# Patient Record
Sex: MALE | Race: OTHER | HISPANIC OR LATINO | URBAN - METROPOLITAN AREA
[De-identification: names, ages, dates, MRNs, and addresses within clinical notes are randomized per-mention and may not be internally consistent; named-entity substitution may affect disease eponyms.]

---

## 2022-12-19 ENCOUNTER — HOSPITAL ENCOUNTER (OUTPATIENT)
Facility: HOSPITAL | Age: 27
Discharge: LEFT BEFORE DISCHARGE | End: 2022-12-19
Attending: EMERGENCY MEDICINE | Admitting: STUDENT IN AN ORGANIZED HEALTH CARE EDUCATION/TRAINING PROGRAM

## 2022-12-19 VITALS
OXYGEN SATURATION: 99 % | DIASTOLIC BLOOD PRESSURE: 72 MMHG | WEIGHT: 200 LBS | HEART RATE: 77 BPM | BODY MASS INDEX: 28.63 KG/M2 | RESPIRATION RATE: 18 BRPM | HEIGHT: 70 IN | SYSTOLIC BLOOD PRESSURE: 152 MMHG | TEMPERATURE: 99 F

## 2022-12-19 DIAGNOSIS — K35.80 ACUTE APPENDICITIS, UNSPECIFIED ACUTE APPENDICITIS TYPE: Primary | ICD-10-CM

## 2022-12-19 PROBLEM — K35.30 ACUTE APPENDICITIS WITH LOCALIZED PERITONITIS, WITHOUT PERFORATION, ABSCESS, OR GANGRENE: Status: ACTIVE | Noted: 2022-12-19

## 2022-12-19 LAB
ALBUMIN SERPL BCP-MCNC: 4.4 G/DL (ref 3.5–5.2)
ALP SERPL-CCNC: 70 U/L (ref 55–135)
ALT SERPL W/O P-5'-P-CCNC: 20 U/L (ref 10–44)
ANION GAP SERPL CALC-SCNC: 8 MMOL/L (ref 8–16)
AST SERPL-CCNC: 18 U/L (ref 10–40)
BASOPHILS # BLD AUTO: 0.04 K/UL (ref 0–0.2)
BASOPHILS NFR BLD: 0.3 % (ref 0–1.9)
BILIRUB SERPL-MCNC: 0.4 MG/DL (ref 0.1–1)
BUN SERPL-MCNC: 8 MG/DL (ref 6–20)
CALCIUM SERPL-MCNC: 9.2 MG/DL (ref 8.7–10.5)
CHLORIDE SERPL-SCNC: 102 MMOL/L (ref 95–110)
CO2 SERPL-SCNC: 23 MMOL/L (ref 23–29)
CREAT SERPL-MCNC: 0.7 MG/DL (ref 0.5–1.4)
DIFFERENTIAL METHOD: ABNORMAL
EOSINOPHIL # BLD AUTO: 0 K/UL (ref 0–0.5)
EOSINOPHIL NFR BLD: 0 % (ref 0–8)
ERYTHROCYTE [DISTWIDTH] IN BLOOD BY AUTOMATED COUNT: 12.4 % (ref 11.5–14.5)
EST. GFR  (NO RACE VARIABLE): >60 ML/MIN/1.73 M^2
GLUCOSE SERPL-MCNC: 120 MG/DL (ref 70–110)
HCT VFR BLD AUTO: 46.6 % (ref 40–54)
HCV AB SERPL QL IA: NORMAL
HGB BLD-MCNC: 15.6 G/DL (ref 14–18)
HIV 1+2 AB+HIV1 P24 AG SERPL QL IA: NORMAL
IMM GRANULOCYTES # BLD AUTO: 0.11 K/UL (ref 0–0.04)
IMM GRANULOCYTES NFR BLD AUTO: 0.7 % (ref 0–0.5)
LIPASE SERPL-CCNC: 9 U/L (ref 4–60)
LYMPHOCYTES # BLD AUTO: 1.1 K/UL (ref 1–4.8)
LYMPHOCYTES NFR BLD: 6.7 % (ref 18–48)
MCH RBC QN AUTO: 29.1 PG (ref 27–31)
MCHC RBC AUTO-ENTMCNC: 33.5 G/DL (ref 32–36)
MCV RBC AUTO: 87 FL (ref 82–98)
MONOCYTES # BLD AUTO: 0.5 K/UL (ref 0.3–1)
MONOCYTES NFR BLD: 2.8 % (ref 4–15)
NEUTROPHILS # BLD AUTO: 14.3 K/UL (ref 1.8–7.7)
NEUTROPHILS NFR BLD: 89.5 % (ref 38–73)
NRBC BLD-RTO: 0 /100 WBC
PLATELET # BLD AUTO: 272 K/UL (ref 150–450)
PMV BLD AUTO: 9.9 FL (ref 9.2–12.9)
POTASSIUM SERPL-SCNC: 4.2 MMOL/L (ref 3.5–5.1)
PROT SERPL-MCNC: 7.8 G/DL (ref 6–8.4)
RBC # BLD AUTO: 5.36 M/UL (ref 4.6–6.2)
SODIUM SERPL-SCNC: 133 MMOL/L (ref 136–145)
WBC # BLD AUTO: 15.93 K/UL (ref 3.9–12.7)

## 2022-12-19 PROCEDURE — G0378 HOSPITAL OBSERVATION PER HR: HCPCS

## 2022-12-19 PROCEDURE — 63600175 PHARM REV CODE 636 W HCPCS: Performed by: STUDENT IN AN ORGANIZED HEALTH CARE EDUCATION/TRAINING PROGRAM

## 2022-12-19 PROCEDURE — 25000003 PHARM REV CODE 250: Performed by: EMERGENCY MEDICINE

## 2022-12-19 PROCEDURE — 99285 PR EMERGENCY DEPT VISIT,LEVEL V: ICD-10-PCS | Mod: ,,, | Performed by: EMERGENCY MEDICINE

## 2022-12-19 PROCEDURE — 83690 ASSAY OF LIPASE: CPT | Performed by: EMERGENCY MEDICINE

## 2022-12-19 PROCEDURE — 96365 THER/PROPH/DIAG IV INF INIT: CPT

## 2022-12-19 PROCEDURE — S0030 INJECTION, METRONIDAZOLE: HCPCS | Performed by: EMERGENCY MEDICINE

## 2022-12-19 PROCEDURE — 99285 EMERGENCY DEPT VISIT HI MDM: CPT | Mod: ,,, | Performed by: EMERGENCY MEDICINE

## 2022-12-19 PROCEDURE — 96375 TX/PRO/DX INJ NEW DRUG ADDON: CPT

## 2022-12-19 PROCEDURE — 99285 EMERGENCY DEPT VISIT HI MDM: CPT | Mod: 25

## 2022-12-19 PROCEDURE — 85025 COMPLETE CBC W/AUTO DIFF WBC: CPT | Performed by: EMERGENCY MEDICINE

## 2022-12-19 PROCEDURE — 96372 THER/PROPH/DIAG INJ SC/IM: CPT | Mod: 59 | Performed by: EMERGENCY MEDICINE

## 2022-12-19 PROCEDURE — 80053 COMPREHEN METABOLIC PANEL: CPT | Performed by: EMERGENCY MEDICINE

## 2022-12-19 PROCEDURE — 87389 HIV-1 AG W/HIV-1&-2 AB AG IA: CPT | Performed by: PHYSICIAN ASSISTANT

## 2022-12-19 PROCEDURE — 96368 THER/DIAG CONCURRENT INF: CPT

## 2022-12-19 PROCEDURE — 86803 HEPATITIS C AB TEST: CPT | Performed by: PHYSICIAN ASSISTANT

## 2022-12-19 PROCEDURE — 63600175 PHARM REV CODE 636 W HCPCS: Performed by: EMERGENCY MEDICINE

## 2022-12-19 RX ORDER — ONDANSETRON 2 MG/ML
4 INJECTION INTRAMUSCULAR; INTRAVENOUS
Status: DISCONTINUED | OUTPATIENT
Start: 2022-12-19 | End: 2022-12-19

## 2022-12-19 RX ORDER — LIDOCAINE HYDROCHLORIDE 10 MG/ML
1 INJECTION, SOLUTION EPIDURAL; INFILTRATION; INTRACAUDAL; PERINEURAL ONCE AS NEEDED
Status: DISCONTINUED | OUTPATIENT
Start: 2022-12-19 | End: 2022-12-19 | Stop reason: HOSPADM

## 2022-12-19 RX ORDER — DICYCLOMINE HYDROCHLORIDE 10 MG/ML
20 INJECTION INTRAMUSCULAR
Status: COMPLETED | OUTPATIENT
Start: 2022-12-19 | End: 2022-12-19

## 2022-12-19 RX ORDER — METRONIDAZOLE 500 MG/100ML
500 INJECTION, SOLUTION INTRAVENOUS
Status: DISCONTINUED | OUTPATIENT
Start: 2022-12-19 | End: 2022-12-19 | Stop reason: HOSPADM

## 2022-12-19 RX ORDER — SODIUM CHLORIDE, SODIUM LACTATE, POTASSIUM CHLORIDE, CALCIUM CHLORIDE 600; 310; 30; 20 MG/100ML; MG/100ML; MG/100ML; MG/100ML
INJECTION, SOLUTION INTRAVENOUS CONTINUOUS
Status: DISCONTINUED | OUTPATIENT
Start: 2022-12-19 | End: 2022-12-19 | Stop reason: HOSPADM

## 2022-12-19 RX ORDER — ONDANSETRON 2 MG/ML
4 INJECTION INTRAMUSCULAR; INTRAVENOUS
Status: COMPLETED | OUTPATIENT
Start: 2022-12-19 | End: 2022-12-19

## 2022-12-19 RX ORDER — TALC
6 POWDER (GRAM) TOPICAL NIGHTLY
Status: DISCONTINUED | OUTPATIENT
Start: 2022-12-19 | End: 2022-12-19 | Stop reason: HOSPADM

## 2022-12-19 RX ORDER — METRONIDAZOLE 500 MG/100ML
500 INJECTION, SOLUTION INTRAVENOUS
Status: DISCONTINUED | OUTPATIENT
Start: 2022-12-20 | End: 2022-12-19 | Stop reason: HOSPADM

## 2022-12-19 RX ORDER — HYDROMORPHONE HYDROCHLORIDE 1 MG/ML
0.5 INJECTION, SOLUTION INTRAMUSCULAR; INTRAVENOUS; SUBCUTANEOUS
Status: COMPLETED | OUTPATIENT
Start: 2022-12-19 | End: 2022-12-19

## 2022-12-19 RX ORDER — SODIUM CHLORIDE 0.9 % (FLUSH) 0.9 %
10 SYRINGE (ML) INJECTION
Status: DISCONTINUED | OUTPATIENT
Start: 2022-12-19 | End: 2022-12-19 | Stop reason: HOSPADM

## 2022-12-19 RX ORDER — ONDANSETRON 2 MG/ML
4 INJECTION INTRAMUSCULAR; INTRAVENOUS EVERY 6 HOURS PRN
Status: DISCONTINUED | OUTPATIENT
Start: 2022-12-19 | End: 2022-12-19 | Stop reason: HOSPADM

## 2022-12-19 RX ORDER — CIPROFLOXACIN 2 MG/ML
400 INJECTION, SOLUTION INTRAVENOUS
Status: COMPLETED | OUTPATIENT
Start: 2022-12-19 | End: 2022-12-19

## 2022-12-19 RX ORDER — ACETAMINOPHEN 325 MG/1
650 TABLET ORAL EVERY 8 HOURS PRN
Status: DISCONTINUED | OUTPATIENT
Start: 2022-12-19 | End: 2022-12-19 | Stop reason: HOSPADM

## 2022-12-19 RX ORDER — CIPROFLOXACIN 2 MG/ML
400 INJECTION, SOLUTION INTRAVENOUS
Status: DISCONTINUED | OUTPATIENT
Start: 2022-12-20 | End: 2022-12-19 | Stop reason: HOSPADM

## 2022-12-19 RX ADMIN — HYDROMORPHONE HYDROCHLORIDE 0.5 MG: 1 INJECTION, SOLUTION INTRAMUSCULAR; INTRAVENOUS; SUBCUTANEOUS at 04:12

## 2022-12-19 RX ADMIN — DICYCLOMINE HYDROCHLORIDE 20 MG: 20 INJECTION INTRAMUSCULAR at 03:12

## 2022-12-19 RX ADMIN — CIPROFLOXACIN 400 MG: 2 INJECTION, SOLUTION INTRAVENOUS at 05:12

## 2022-12-19 RX ADMIN — SODIUM CHLORIDE, POTASSIUM CHLORIDE, SODIUM LACTATE AND CALCIUM CHLORIDE 1000 ML: 600; 310; 30; 20 INJECTION, SOLUTION INTRAVENOUS at 02:12

## 2022-12-19 RX ADMIN — METRONIDAZOLE 500 MG: 500 INJECTION, SOLUTION INTRAVENOUS at 05:12

## 2022-12-19 RX ADMIN — SODIUM CHLORIDE, POTASSIUM CHLORIDE, SODIUM LACTATE AND CALCIUM CHLORIDE: 600; 310; 30; 20 INJECTION, SOLUTION INTRAVENOUS at 05:12

## 2022-12-19 RX ADMIN — ONDANSETRON 4 MG: 2 INJECTION INTRAMUSCULAR; INTRAVENOUS at 03:12

## 2022-12-19 NOTE — Clinical Note
Diagnosis: Acute appendicitis, unspecified acute appendicitis type [[id number]]   Future Attending Provider: MAGNOLIA BRISENO [[id number]]   Admitting Provider:: MAGNOLIA BRISENO [[id number]]   Bed request comments: please do not put this human in EDOU

## 2022-12-19 NOTE — ED PROVIDER NOTES
Encounter Date: 12/19/2022    SCRIBE #1 NOTE: I, Meg Matthews, am scribing for, and in the presence of,  Toño Ambriz MD. I have scribed the entire note.     History     Chief Complaint   Patient presents with    Abdominal Pain     Pt c/o abdominal pain & vomiting     Time patient was seen by the provider: 2:13 PM      The patient is a 27 y.o. male with no significant past medical history who presents to the ED with a complaint of periumbilical abdominal pain onset this morning. Patient reports the onset of his abdominal pain was sudden and was asymptomatic prior to this episode. Described as a severe, constant pain that is scaled at a 10. Associated symptoms include abdominal distension, mild nausea, vomiting, and diarrhea. Denies fever, constipation, dysuria, and hematuria. There are no other exacerbating or alleviating factors at this time. Patient reports he has not had a similar episode in the past. Denies any pertinent or major PMHx. He also denies a social history of tobacco or illicit drug use. However, pt endorses EtOH use with his last drink approximately 15 days ago.     The history is provided by the patient and medical records. The history is limited by a language barrier. A  was used.   Review of patient's allergies indicates:   Allergen Reactions    Penicillins Anaphylaxis     Rash and throat closes     History reviewed. No pertinent past medical history.  History reviewed. No pertinent surgical history.  History reviewed. No pertinent family history.  Social History     Tobacco Use    Smoking status: Never    Smokeless tobacco: Never   Substance Use Topics    Alcohol use: Yes     Comment: occas, not daily    Drug use: Never     Review of Systems   Constitutional:  Negative for fever.   HENT:  Negative for sore throat.    Eyes:  Negative for visual disturbance.   Respiratory:  Negative for cough and shortness of breath.    Cardiovascular:  Negative for chest pain.    Gastrointestinal:  Positive for abdominal distention, abdominal pain, diarrhea, nausea and vomiting. Negative for blood in stool and constipation.   Genitourinary:  Negative for dysuria and hematuria.   Musculoskeletal:  Negative for neck pain.   Skin:  Negative for rash and wound.   Allergic/Immunologic: Negative for immunocompromised state.   Neurological:  Negative for syncope.   Psychiatric/Behavioral:  Negative for confusion.      Physical Exam     Initial Vitals [12/19/22 1237]   BP Pulse Resp Temp SpO2   133/75 69 16 97.3 °F (36.3 °C) 100 %      MAP       --         Physical Exam    Nursing note and vitals reviewed.  Constitutional: He appears well-developed and well-nourished. He is not diaphoretic. He appears distressed.   Distressed with pain.   HENT:   Head: Normocephalic and atraumatic.   Mouth/Throat: Mucous membranes are dry.   Eyes: EOM are normal. Pupils are equal, round, and reactive to light.   Neck: Neck supple.   Normal range of motion.  Cardiovascular:  Normal rate, regular rhythm, normal heart sounds and intact distal pulses.     Exam reveals no gallop and no friction rub.       No murmur heard.  Pulmonary/Chest: Breath sounds normal. No respiratory distress. He has no wheezes. He has no rhonchi. He has no rales.   Abdominal: Abdomen is soft. He exhibits no distension. There is abdominal tenderness in the periumbilical area. No hernia.   Periumbilical tenderness is noted with voluntary guarding. No masses or pulsations. Hypoactive bowel sounds auscultation. No epigastric tenderness.   No right CVA tenderness.  No left CVA tenderness. There is guarding. There is no rebound, no tenderness at McBurney's point and negative Perez's sign.   Genitourinary:    Genitourinary Comments: No CVA tenderness.     Musculoskeletal:         General: No tenderness or edema. Normal range of motion.      Cervical back: Normal range of motion and neck supple.     Neurological: He is alert and oriented to person,  place, and time. He has normal strength.   Skin: Skin is warm and dry. No rash noted.       ED Course   Procedures  Labs Reviewed   CBC W/ AUTO DIFFERENTIAL - Abnormal; Notable for the following components:       Result Value    WBC 15.93 (*)     Immature Granulocytes 0.7 (*)     Gran # (ANC) 14.3 (*)     Immature Grans (Abs) 0.11 (*)     Gran % 89.5 (*)     Lymph % 6.7 (*)     Mono % 2.8 (*)     All other components within normal limits   COMPREHENSIVE METABOLIC PANEL - Abnormal; Notable for the following components:    Sodium 133 (*)     Glucose 120 (*)     All other components within normal limits   HIV 1 / 2 ANTIBODY    Narrative:     Release to patient->Immediate   HEPATITIS C ANTIBODY    Narrative:     Release to patient->Immediate   LIPASE          Imaging Results               CT Abdomen Pelvis  Without Contrast (Final result)  Result time 12/19/22 16:34:33      Final result by Rosalio Mukherjee IV, MD (12/19/22 16:34:33)                   Impression:      Distended appendix with appendicoliths and mild surrounding inflammatory change suggestive of acute appendicitis.  No evidence of perforation or abscess.  Recommend correlation with history/exam.    Additional findings in the body of the report.    This report was flagged in Epic as abnormal.    Electronically signed by resident: Des Harrison  Date:    12/19/2022  Time:    16:01    Electronically signed by: Rosalio Mukherjee  Date:    12/19/2022  Time:    16:34               Narrative:    EXAMINATION:  CT ABDOMEN PELVIS WITHOUT CONTRAST    CLINICAL HISTORY:  Abdominal pain, acute, nonlocalized;    TECHNIQUE:  Low dose axial images, sagittal and coronal reformations were obtained from the lung bases to the pubic symphysis.  No intravenous or oral contrast was administered.  Coronal and sagittal reformats were created.    COMPARISON:  None    FINDINGS:  Heart is normal in size, no pericardial effusion.  Aorta tapers normally.    Lung bases are symmetrically  expanded.  No focal airspace opacities or abnormal pleural fluid.  Pulmonary micronodule along the right minor fissure measuring the order of 3 mm.  Punctate calcified granuloma at the right lung base.    Liver normal in size and attenuation, no focal lesions.  No calcified gallstones.  No biliary dilatation.  Spleen, pancreas and adrenal glands are unremarkable.  Kidneys are normal in size and contour.  No hydronephrosis.    Distal esophagus, stomach and duodenum are unremarkable.  No dilated loops of small or large bowel.  In the right lower quadrant there are 2 calcified appendicoliths within the proximal appendix measuring up to 1.0 cm.  There is distension of the appendix to 1.5 cm with wall thickening and hazy surrounding fat stranding suggestive of mild inflammatory change.  Small focus of air anti dependently within the appendix.    There appears to be nondistention and/or wall thickening of the adjacent ascending colon which is nearly abutting the appendix, likely a reactive process.  The remainder of the colon appears normal without evidence inflammatory change elsewhere.  No pneumoperitoneum.    Urinary bladder is distended without wall thickening.  No concerning pelvic lymphadenopathy.  No free pelvic fluid.    Subcutaneous soft tissue structures are unremarkable.  Bilateral L5 pars defects, likely chronic.  No lytic or destructive osseous lesion.                                       Medications   ciprofloxacin (CIPRO)400mg/200ml D5W IVPB 400 mg ( Intravenous Verify Only 12/19/22 1716)   metronidazole IVPB 500 mg (500 mg Intravenous New Bag 12/19/22 1717)   LIDOcaine (PF) 10 mg/ml (1%) injection 10 mg (has no administration in time range)   sodium chloride 0.9% flush 10 mL (has no administration in time range)   melatonin tablet 6 mg (has no administration in time range)   acetaminophen tablet 650 mg (has no administration in time range)   lactated ringers infusion (has no administration in time range)    ondansetron injection 4 mg (has no administration in time range)   ciprofloxacin (CIPRO)400mg/200ml D5W IVPB 400 mg (has no administration in time range)   metronidazole IVPB 500 mg (has no administration in time range)   lactated ringers bolus 1,000 mL (0 mLs Intravenous Stopped 12/19/22 1508)   ondansetron injection 4 mg (4 mg Intravenous Given 12/19/22 1507)   dicyclomine injection 20 mg (20 mg Intramuscular Given 12/19/22 1505)   HYDROmorphone injection 0.5 mg (0.5 mg Intravenous Given 12/19/22 1609)     Medical Decision Making:   History:   Old Medical Records: I decided to obtain old medical records.  Initial Assessment:   This is an emergent evaluation. Labs, CT, UA, IV fluids, IV Zofran, and IM bentyl have been ordered. I will reassess.  Clinical Tests:   Lab Tests: Ordered and Reviewed  Radiological Study: Ordered and Reviewed  ED Management:  16:01  The patient's pain persists despite IM bentyl. A small dose of dilaudid has been ordered.    16:26  The pt has a leukocytosis with a left shift. Chemistry and lipase are ok. CT scan is pending.    16:51  CT scan significant for acute appendicitis. I will consult General Surgery    16:53  General Surgery has not paged. A dose of IV ciprofloxacin has been ordered.    17:10   Via the , the patient states that he must leave against medical advice.  He states that he would like to speak to his family in person.  He would also like to let his employer know that he has appendicitis.  He is adamantly refusing admission or surgery at this time.  I explained to the patient that he runs the risks of appendix rupture, sepsis, bacteremia, worsening condition, and death.  He is alert and oriented x4.  He is able to make informed decisions for himself.  He has no signs of clinical intoxication.  He states that he will definitely return to the hospital tomorrow morning to be admitted for surgery.  He understands the risks and complications associated with  leaving against medical advice and will sign the AMA paperwork.  He has agreed to stay in the hospital until a dose of Cipro and Flagyl can be provided intravenously.  I have also instructed the patient to remain NPO.    17:17  The AMA form has been signed.  The patient is now receiving Flagyl and Cipro intravenously.  I am still attempting to get a hold of the general surgeon to let them know that the patient is leaving against medical advice.        Scribe Attestation:   Scribe #1: I performed the above scribed service and the documentation accurately describes the services I performed. I attest to the accuracy of the note.                   Clinical Impression:   Final diagnoses:  [K35.80] Acute appendicitis, unspecified acute appendicitis type (Primary)        ED Disposition Condition    AMA Fair                Toño Ambriz MD  12/19/22 8744

## 2022-12-20 ENCOUNTER — ANESTHESIA (OUTPATIENT)
Dept: SURGERY | Facility: HOSPITAL | Age: 27
End: 2022-12-20

## 2022-12-20 ENCOUNTER — ANESTHESIA EVENT (OUTPATIENT)
Dept: SURGERY | Facility: HOSPITAL | Age: 27
End: 2022-12-20

## 2022-12-20 ENCOUNTER — HOSPITAL ENCOUNTER (OUTPATIENT)
Facility: HOSPITAL | Age: 27
Discharge: HOME | End: 2022-12-21
Attending: EMERGENCY MEDICINE | Admitting: STUDENT IN AN ORGANIZED HEALTH CARE EDUCATION/TRAINING PROGRAM

## 2022-12-20 DIAGNOSIS — K35.30 ACUTE APPENDICITIS WITH LOCALIZED PERITONITIS, WITHOUT PERFORATION, ABSCESS, OR GANGRENE: Primary | ICD-10-CM

## 2022-12-20 LAB
ALBUMIN SERPL BCP-MCNC: 4.2 G/DL (ref 3.5–5.2)
ALP SERPL-CCNC: 67 U/L (ref 55–135)
ALT SERPL W/O P-5'-P-CCNC: 18 U/L (ref 10–44)
ANION GAP SERPL CALC-SCNC: 10 MMOL/L (ref 8–16)
AST SERPL-CCNC: 16 U/L (ref 10–40)
BASOPHILS # BLD AUTO: 0.03 K/UL (ref 0–0.2)
BASOPHILS NFR BLD: 0.1 % (ref 0–1.9)
BILIRUB SERPL-MCNC: 0.8 MG/DL (ref 0.1–1)
BILIRUB UR QL STRIP: NEGATIVE
BUN SERPL-MCNC: 7 MG/DL (ref 6–20)
CALCIUM SERPL-MCNC: 9.3 MG/DL (ref 8.7–10.5)
CHLORIDE SERPL-SCNC: 97 MMOL/L (ref 95–110)
CLARITY UR REFRACT.AUTO: CLEAR
CO2 SERPL-SCNC: 24 MMOL/L (ref 23–29)
COLOR UR AUTO: COLORLESS
CREAT SERPL-MCNC: 0.9 MG/DL (ref 0.5–1.4)
DIFFERENTIAL METHOD: ABNORMAL
EOSINOPHIL # BLD AUTO: 0 K/UL (ref 0–0.5)
EOSINOPHIL NFR BLD: 0.1 % (ref 0–8)
ERYTHROCYTE [DISTWIDTH] IN BLOOD BY AUTOMATED COUNT: 12.2 % (ref 11.5–14.5)
EST. GFR  (NO RACE VARIABLE): >60 ML/MIN/1.73 M^2
GLUCOSE SERPL-MCNC: 128 MG/DL (ref 70–110)
GLUCOSE UR QL STRIP: NEGATIVE
HCT VFR BLD AUTO: 47 % (ref 40–54)
HGB BLD-MCNC: 16.5 G/DL (ref 14–18)
HGB UR QL STRIP: NEGATIVE
IMM GRANULOCYTES # BLD AUTO: 0.08 K/UL (ref 0–0.04)
IMM GRANULOCYTES NFR BLD AUTO: 0.4 % (ref 0–0.5)
KETONES UR QL STRIP: NEGATIVE
LACTATE SERPL-SCNC: 2.1 MMOL/L (ref 0.5–2.2)
LEUKOCYTE ESTERASE UR QL STRIP: NEGATIVE
LYMPHOCYTES # BLD AUTO: 1.5 K/UL (ref 1–4.8)
LYMPHOCYTES NFR BLD: 7.6 % (ref 18–48)
MCH RBC QN AUTO: 29.6 PG (ref 27–31)
MCHC RBC AUTO-ENTMCNC: 35.1 G/DL (ref 32–36)
MCV RBC AUTO: 84 FL (ref 82–98)
MONOCYTES # BLD AUTO: 1.6 K/UL (ref 0.3–1)
MONOCYTES NFR BLD: 8.1 % (ref 4–15)
NEUTROPHILS # BLD AUTO: 16.9 K/UL (ref 1.8–7.7)
NEUTROPHILS NFR BLD: 83.7 % (ref 38–73)
NITRITE UR QL STRIP: NEGATIVE
NRBC BLD-RTO: 0 /100 WBC
PH UR STRIP: 7 [PH] (ref 5–8)
PLATELET # BLD AUTO: 261 K/UL (ref 150–450)
PMV BLD AUTO: 9.4 FL (ref 9.2–12.9)
POTASSIUM SERPL-SCNC: 3.8 MMOL/L (ref 3.5–5.1)
PROT SERPL-MCNC: 7.7 G/DL (ref 6–8.4)
PROT UR QL STRIP: NEGATIVE
RBC # BLD AUTO: 5.58 M/UL (ref 4.6–6.2)
SODIUM SERPL-SCNC: 131 MMOL/L (ref 136–145)
SP GR UR STRIP: 1.01 (ref 1–1.03)
URN SPEC COLLECT METH UR: ABNORMAL
WBC # BLD AUTO: 20.18 K/UL (ref 3.9–12.7)

## 2022-12-20 PROCEDURE — 36000709 HC OR TIME LEV III EA ADD 15 MIN: Performed by: STUDENT IN AN ORGANIZED HEALTH CARE EDUCATION/TRAINING PROGRAM

## 2022-12-20 PROCEDURE — 99285 EMERGENCY DEPT VISIT HI MDM: CPT | Mod: ,,, | Performed by: EMERGENCY MEDICINE

## 2022-12-20 PROCEDURE — 63600175 PHARM REV CODE 636 W HCPCS: Performed by: STUDENT IN AN ORGANIZED HEALTH CARE EDUCATION/TRAINING PROGRAM

## 2022-12-20 PROCEDURE — 88304 TISSUE EXAM BY PATHOLOGIST: CPT | Performed by: PATHOLOGY

## 2022-12-20 PROCEDURE — 25000003 PHARM REV CODE 250: Performed by: STUDENT IN AN ORGANIZED HEALTH CARE EDUCATION/TRAINING PROGRAM

## 2022-12-20 PROCEDURE — 80053 COMPREHEN METABOLIC PANEL: CPT | Performed by: EMERGENCY MEDICINE

## 2022-12-20 PROCEDURE — D9220A PRA ANESTHESIA: ICD-10-PCS | Mod: ,,, | Performed by: ANESTHESIOLOGY

## 2022-12-20 PROCEDURE — 44970 LAPAROSCOPY APPENDECTOMY: CPT | Mod: ,,, | Performed by: STUDENT IN AN ORGANIZED HEALTH CARE EDUCATION/TRAINING PROGRAM

## 2022-12-20 PROCEDURE — 27201423 OPTIME MED/SURG SUP & DEVICES STERILE SUPPLY: Performed by: STUDENT IN AN ORGANIZED HEALTH CARE EDUCATION/TRAINING PROGRAM

## 2022-12-20 PROCEDURE — 37000008 HC ANESTHESIA 1ST 15 MINUTES: Performed by: STUDENT IN AN ORGANIZED HEALTH CARE EDUCATION/TRAINING PROGRAM

## 2022-12-20 PROCEDURE — 25000003 PHARM REV CODE 250: Performed by: NURSE ANESTHETIST, CERTIFIED REGISTERED

## 2022-12-20 PROCEDURE — G0378 HOSPITAL OBSERVATION PER HR: HCPCS

## 2022-12-20 PROCEDURE — 44970 PR LAP,APPENDECTOMY: ICD-10-PCS | Mod: ,,, | Performed by: STUDENT IN AN ORGANIZED HEALTH CARE EDUCATION/TRAINING PROGRAM

## 2022-12-20 PROCEDURE — 96361 HYDRATE IV INFUSION ADD-ON: CPT

## 2022-12-20 PROCEDURE — 96365 THER/PROPH/DIAG IV INF INIT: CPT

## 2022-12-20 PROCEDURE — S0030 INJECTION, METRONIDAZOLE: HCPCS | Performed by: STUDENT IN AN ORGANIZED HEALTH CARE EDUCATION/TRAINING PROGRAM

## 2022-12-20 PROCEDURE — 85025 COMPLETE CBC W/AUTO DIFF WBC: CPT | Performed by: EMERGENCY MEDICINE

## 2022-12-20 PROCEDURE — 71000015 HC POSTOP RECOV 1ST HR: Performed by: STUDENT IN AN ORGANIZED HEALTH CARE EDUCATION/TRAINING PROGRAM

## 2022-12-20 PROCEDURE — 96367 TX/PROPH/DG ADDL SEQ IV INF: CPT

## 2022-12-20 PROCEDURE — 25000003 PHARM REV CODE 250: Performed by: EMERGENCY MEDICINE

## 2022-12-20 PROCEDURE — 63600175 PHARM REV CODE 636 W HCPCS: Performed by: NURSE ANESTHETIST, CERTIFIED REGISTERED

## 2022-12-20 PROCEDURE — 99285 EMERGENCY DEPT VISIT HI MDM: CPT | Mod: 25

## 2022-12-20 PROCEDURE — 71000033 HC RECOVERY, INTIAL HOUR: Performed by: STUDENT IN AN ORGANIZED HEALTH CARE EDUCATION/TRAINING PROGRAM

## 2022-12-20 PROCEDURE — 88304 PR  SURG PATH,LEVEL III: ICD-10-PCS | Mod: 26,,, | Performed by: PATHOLOGY

## 2022-12-20 PROCEDURE — 99285 PR EMERGENCY DEPT VISIT,LEVEL V: ICD-10-PCS | Mod: ,,, | Performed by: EMERGENCY MEDICINE

## 2022-12-20 PROCEDURE — D9220A PRA ANESTHESIA: Mod: ,,, | Performed by: ANESTHESIOLOGY

## 2022-12-20 PROCEDURE — 37000009 HC ANESTHESIA EA ADD 15 MINS: Performed by: STUDENT IN AN ORGANIZED HEALTH CARE EDUCATION/TRAINING PROGRAM

## 2022-12-20 PROCEDURE — 96372 THER/PROPH/DIAG INJ SC/IM: CPT | Performed by: STUDENT IN AN ORGANIZED HEALTH CARE EDUCATION/TRAINING PROGRAM

## 2022-12-20 PROCEDURE — 81003 URINALYSIS AUTO W/O SCOPE: CPT | Performed by: STUDENT IN AN ORGANIZED HEALTH CARE EDUCATION/TRAINING PROGRAM

## 2022-12-20 PROCEDURE — 96375 TX/PRO/DX INJ NEW DRUG ADDON: CPT

## 2022-12-20 PROCEDURE — 94761 N-INVAS EAR/PLS OXIMETRY MLT: CPT

## 2022-12-20 PROCEDURE — 96366 THER/PROPH/DIAG IV INF ADDON: CPT

## 2022-12-20 PROCEDURE — 88304 TISSUE EXAM BY PATHOLOGIST: CPT | Mod: 26,,, | Performed by: PATHOLOGY

## 2022-12-20 PROCEDURE — 36000708 HC OR TIME LEV III 1ST 15 MIN: Performed by: STUDENT IN AN ORGANIZED HEALTH CARE EDUCATION/TRAINING PROGRAM

## 2022-12-20 PROCEDURE — 83605 ASSAY OF LACTIC ACID: CPT | Performed by: EMERGENCY MEDICINE

## 2022-12-20 RX ORDER — OXYCODONE AND ACETAMINOPHEN 5; 325 MG/1; MG/1
1 TABLET ORAL
Status: DISCONTINUED | OUTPATIENT
Start: 2022-12-20 | End: 2022-12-20 | Stop reason: HOSPADM

## 2022-12-20 RX ORDER — MIDAZOLAM HYDROCHLORIDE 1 MG/ML
INJECTION, SOLUTION INTRAMUSCULAR; INTRAVENOUS
Status: DISCONTINUED | OUTPATIENT
Start: 2022-12-20 | End: 2022-12-20

## 2022-12-20 RX ORDER — ENOXAPARIN SODIUM 100 MG/ML
40 INJECTION SUBCUTANEOUS EVERY 24 HOURS
Status: DISCONTINUED | OUTPATIENT
Start: 2022-12-20 | End: 2022-12-21 | Stop reason: HOSPADM

## 2022-12-20 RX ORDER — SODIUM CHLORIDE 9 MG/ML
INJECTION, SOLUTION INTRAVENOUS CONTINUOUS
Status: DISCONTINUED | OUTPATIENT
Start: 2022-12-20 | End: 2022-12-20

## 2022-12-20 RX ORDER — ONDANSETRON 2 MG/ML
4 INJECTION INTRAMUSCULAR; INTRAVENOUS DAILY PRN
Status: DISCONTINUED | OUTPATIENT
Start: 2022-12-20 | End: 2022-12-20 | Stop reason: HOSPADM

## 2022-12-20 RX ORDER — KETOROLAC TROMETHAMINE 30 MG/ML
15 INJECTION, SOLUTION INTRAMUSCULAR; INTRAVENOUS EVERY 8 HOURS
Status: DISCONTINUED | OUTPATIENT
Start: 2022-12-20 | End: 2022-12-20

## 2022-12-20 RX ORDER — CIPROFLOXACIN 2 MG/ML
400 INJECTION, SOLUTION INTRAVENOUS
Status: DISCONTINUED | OUTPATIENT
Start: 2022-12-20 | End: 2022-12-21

## 2022-12-20 RX ORDER — ACETAMINOPHEN 500 MG
1000 TABLET ORAL EVERY 8 HOURS
Status: DISCONTINUED | OUTPATIENT
Start: 2022-12-20 | End: 2022-12-21 | Stop reason: HOSPADM

## 2022-12-20 RX ORDER — OXYCODONE HYDROCHLORIDE 10 MG/1
10 TABLET ORAL EVERY 6 HOURS PRN
Status: DISCONTINUED | OUTPATIENT
Start: 2022-12-20 | End: 2022-12-21 | Stop reason: HOSPADM

## 2022-12-20 RX ORDER — PROPOFOL 10 MG/ML
VIAL (ML) INTRAVENOUS
Status: DISCONTINUED | OUTPATIENT
Start: 2022-12-20 | End: 2022-12-20

## 2022-12-20 RX ORDER — IBUPROFEN 400 MG/1
400 TABLET ORAL EVERY 8 HOURS
Status: DISCONTINUED | OUTPATIENT
Start: 2022-12-20 | End: 2022-12-21 | Stop reason: HOSPADM

## 2022-12-20 RX ORDER — ROCURONIUM BROMIDE 10 MG/ML
INJECTION, SOLUTION INTRAVENOUS
Status: DISCONTINUED | OUTPATIENT
Start: 2022-12-20 | End: 2022-12-20

## 2022-12-20 RX ORDER — SODIUM CHLORIDE 0.9 % (FLUSH) 0.9 %
3 SYRINGE (ML) INJECTION
Status: DISCONTINUED | OUTPATIENT
Start: 2022-12-20 | End: 2022-12-20 | Stop reason: HOSPADM

## 2022-12-20 RX ORDER — BUPIVACAINE HYDROCHLORIDE 2.5 MG/ML
INJECTION, SOLUTION EPIDURAL; INFILTRATION; INTRACAUDAL
Status: DISCONTINUED | OUTPATIENT
Start: 2022-12-20 | End: 2022-12-20 | Stop reason: HOSPADM

## 2022-12-20 RX ORDER — METRONIDAZOLE 500 MG/100ML
500 INJECTION, SOLUTION INTRAVENOUS
Status: DISCONTINUED | OUTPATIENT
Start: 2022-12-20 | End: 2022-12-21

## 2022-12-20 RX ORDER — SODIUM CHLORIDE, SODIUM LACTATE, POTASSIUM CHLORIDE, CALCIUM CHLORIDE 600; 310; 30; 20 MG/100ML; MG/100ML; MG/100ML; MG/100ML
INJECTION, SOLUTION INTRAVENOUS CONTINUOUS
Status: DISCONTINUED | OUTPATIENT
Start: 2022-12-20 | End: 2022-12-21

## 2022-12-20 RX ORDER — HYDROMORPHONE HYDROCHLORIDE 1 MG/ML
0.2 INJECTION, SOLUTION INTRAMUSCULAR; INTRAVENOUS; SUBCUTANEOUS EVERY 5 MIN PRN
Status: DISCONTINUED | OUTPATIENT
Start: 2022-12-20 | End: 2022-12-20 | Stop reason: HOSPADM

## 2022-12-20 RX ORDER — LIDOCAINE HYDROCHLORIDE 20 MG/ML
INJECTION INTRAVENOUS
Status: DISCONTINUED | OUTPATIENT
Start: 2022-12-20 | End: 2022-12-20

## 2022-12-20 RX ORDER — HYDROMORPHONE HYDROCHLORIDE 1 MG/ML
0.2 INJECTION, SOLUTION INTRAMUSCULAR; INTRAVENOUS; SUBCUTANEOUS EVERY 6 HOURS PRN
Status: DISCONTINUED | OUTPATIENT
Start: 2022-12-20 | End: 2022-12-21 | Stop reason: HOSPADM

## 2022-12-20 RX ORDER — DEXAMETHASONE SODIUM PHOSPHATE 4 MG/ML
INJECTION, SOLUTION INTRA-ARTICULAR; INTRALESIONAL; INTRAMUSCULAR; INTRAVENOUS; SOFT TISSUE
Status: DISCONTINUED | OUTPATIENT
Start: 2022-12-20 | End: 2022-12-20

## 2022-12-20 RX ORDER — OXYCODONE HYDROCHLORIDE 5 MG/1
5 TABLET ORAL EVERY 6 HOURS PRN
Status: DISCONTINUED | OUTPATIENT
Start: 2022-12-20 | End: 2022-12-21 | Stop reason: HOSPADM

## 2022-12-20 RX ORDER — HALOPERIDOL 5 MG/ML
0.5 INJECTION INTRAMUSCULAR EVERY 10 MIN PRN
Status: DISCONTINUED | OUTPATIENT
Start: 2022-12-20 | End: 2022-12-20 | Stop reason: HOSPADM

## 2022-12-20 RX ORDER — ONDANSETRON 2 MG/ML
INJECTION INTRAMUSCULAR; INTRAVENOUS
Status: DISCONTINUED | OUTPATIENT
Start: 2022-12-20 | End: 2022-12-20

## 2022-12-20 RX ORDER — ACETAMINOPHEN 10 MG/ML
INJECTION, SOLUTION INTRAVENOUS
Status: DISCONTINUED | OUTPATIENT
Start: 2022-12-20 | End: 2022-12-20

## 2022-12-20 RX ORDER — FENTANYL CITRATE 50 UG/ML
INJECTION, SOLUTION INTRAMUSCULAR; INTRAVENOUS
Status: DISCONTINUED | OUTPATIENT
Start: 2022-12-20 | End: 2022-12-20

## 2022-12-20 RX ADMIN — KETOROLAC TROMETHAMINE 30 MG: 30 INJECTION, SOLUTION INTRAMUSCULAR at 03:12

## 2022-12-20 RX ADMIN — DEXAMETHASONE SODIUM PHOSPHATE 4 MG: 4 INJECTION, SOLUTION INTRAMUSCULAR; INTRAVENOUS at 02:12

## 2022-12-20 RX ADMIN — SODIUM CHLORIDE, POTASSIUM CHLORIDE, SODIUM LACTATE AND CALCIUM CHLORIDE: 600; 310; 30; 20 INJECTION, SOLUTION INTRAVENOUS at 04:12

## 2022-12-20 RX ADMIN — CIPROFLOXACIN 400 MG: 2 INJECTION, SOLUTION INTRAVENOUS at 10:12

## 2022-12-20 RX ADMIN — HYDROMORPHONE HYDROCHLORIDE 1 MG: 1 INJECTION, SOLUTION INTRAMUSCULAR; INTRAVENOUS; SUBCUTANEOUS at 02:12

## 2022-12-20 RX ADMIN — CIPROFLOXACIN 400 MG: 2 INJECTION, SOLUTION INTRAVENOUS at 08:12

## 2022-12-20 RX ADMIN — ENOXAPARIN SODIUM 40 MG: 40 INJECTION SUBCUTANEOUS at 07:12

## 2022-12-20 RX ADMIN — ACETAMINOPHEN 1000 MG: 10 INJECTION INTRAVENOUS at 03:12

## 2022-12-20 RX ADMIN — MIDAZOLAM HYDROCHLORIDE 2 MG: 1 INJECTION, SOLUTION INTRAMUSCULAR; INTRAVENOUS at 02:12

## 2022-12-20 RX ADMIN — SODIUM CHLORIDE: 0.9 INJECTION, SOLUTION INTRAVENOUS at 06:12

## 2022-12-20 RX ADMIN — ROCURONIUM BROMIDE 50 MG: 10 INJECTION INTRAVENOUS at 02:12

## 2022-12-20 RX ADMIN — ONDANSETRON 4 MG: 2 INJECTION INTRAMUSCULAR; INTRAVENOUS at 03:12

## 2022-12-20 RX ADMIN — METRONIDAZOLE 500 MG: 500 INJECTION, SOLUTION INTRAVENOUS at 06:12

## 2022-12-20 RX ADMIN — FENTANYL CITRATE 100 MCG: 50 INJECTION INTRAMUSCULAR; INTRAVENOUS at 02:12

## 2022-12-20 RX ADMIN — CIPROFLOXACIN 400 MG: 2 INJECTION, SOLUTION INTRAVENOUS at 02:12

## 2022-12-20 RX ADMIN — ACETAMINOPHEN 1000 MG: 500 TABLET ORAL at 09:12

## 2022-12-20 RX ADMIN — METRONIDAZOLE 500 MG: 500 INJECTION, SOLUTION INTRAVENOUS at 02:12

## 2022-12-20 RX ADMIN — LIDOCAINE HYDROCHLORIDE 50 MG: 20 INJECTION INTRAVENOUS at 02:12

## 2022-12-20 RX ADMIN — SODIUM CHLORIDE: 0.9 INJECTION, SOLUTION INTRAVENOUS at 02:12

## 2022-12-20 RX ADMIN — PROPOFOL 150 MG: 10 INJECTION, EMULSION INTRAVENOUS at 02:12

## 2022-12-20 RX ADMIN — IBUPROFEN 400 MG: 400 TABLET, FILM COATED ORAL at 09:12

## 2022-12-20 RX ADMIN — GLYCOPYRROLATE 0.1 MG: 0.2 INJECTION, SOLUTION INTRAMUSCULAR; INTRAVENOUS at 02:12

## 2022-12-20 RX ADMIN — HYDROMORPHONE HYDROCHLORIDE 0.2 MG: 1 INJECTION, SOLUTION INTRAMUSCULAR; INTRAVENOUS; SUBCUTANEOUS at 10:12

## 2022-12-20 RX ADMIN — METRONIDAZOLE 500 MG: 500 INJECTION, SOLUTION INTRAVENOUS at 11:12

## 2022-12-20 RX ADMIN — SUGAMMADEX 200 MG: 100 INJECTION, SOLUTION INTRAVENOUS at 03:12

## 2022-12-20 NOTE — PLAN OF CARE
Chart reviewed. Pre-op nursing care per orders. Assessment and history obtained with . Safe surgery checklist complete. Family not present at bedside, sent text to maurizio Early. Patient belongings placed in dosc locker. Waiting for surgery consent.

## 2022-12-20 NOTE — OP NOTE
[facility]  General Surgery  Operative Note    DATE OF PROCEDURE: 12/20/2022     PREOPERATIVE DIAGNOSIS: Acute appendicitis    POSTOPERATIVE DIAGNOSIS: Acute perforated appendicitis     PROCEDURE PERFORMED:  APPENDECTOMY, LAPAROSCOPIC  2.   REPAIR, HERNIA, UMBILICAL    ATTENDING SURGEON: Nhan Helms M.D.     HOUSESTAFF SURGEON: Gregorio Henao M.D. (PGY-3)     ANESTHESIA: General endotracheal and 0.25% bupivacaine.     ESTIMATED BLOOD LOSS: 25 mL.     FINDINGS: Acute perforated appendicitis and small fat-containing umbilical hernia     SPECIMEN: Appendix.     DRAINS: None.     COMPLICATIONS: None.     INDICATIONS: Linus Grant is a 27 y.o. man who presented to the Emergency Department with periumbilical abdominal pain, nausea, emesis and anorexia.  The history and exam were consistent with acute appendicitis, which was confirmed by laboratory studies and a CT scan. We recommended laparoscopic appendectomy and the patient initially left AMA because he needed to take care of work obligations. He re-presented to the ED with worsening symptoms and he was offered surgery.  The patient agreed to proceed. The patient signed informed consent and expressed understanding of the risks and benefits of surgery using a .     OPERATIVE PROCEDURE: The patient was identified in Preoperative Holding and brought back to the Operating Room. He was placed supine on the operating table and padded appropriately. Monitors were applied and there was smooth induction of general endotracheal anesthesia. A Bullock catheter was placed. The patient's abdomen was prepped and draped in the standard sterile surgical fashion. A time-out was performed and all team members present agreed this was the correct procedure on the correct patient. We also confirmed administration of appropriate preoperative antibiotics.    A 2-cm infraumbilical skin incision was made. Subcutaneous tissue was bluntly dissected.  We encountered a small umbilical hernia containing fat. We excised the sac and the abdomen was bluntly entered under direct vision.  A 12mm balloon trocar was placed and the abdomen was insufflated with carbon dioxide to a maximum pressure of 15 mmHg. A 10-mm laparoscope was placed and the abdomen was examined. There was no evidence of injury from the initial trocar placement. Two 5-mm trocars were placed under direct vision through separate stab incisions, one in the suprapubic area avoiding the dome of the bladder and one in the left lower quadrant avoiding the inferior epigastric artery. We directed our attention to the right lower quadrant. The appendix was identified adhered to the anterior abdominal and noted to have significant inflammatory change without evidence of perforation. However, when we grasped to elevate the appendix, the base of the appendix was perforated and there was spillage of some stool. This was aspirated. The appendix was elevated. A mesenteric defect was made at the base of the appendix using the [place] dissector. The appendix was divided at the base using the Endo-LEOBARDO stapler with a blue (45-3.5) load. The mesoappendix was then divided with one white (45-2.5) loads of the stapler. The appendix was placed into an Endocatch bag and removed from the Tahmina trocar without difficulty. We returned the laparoscope and Tahmina trocar to the umbilicus and reexamined the right lower quadrant. The staple line on the mesoappendix was examined and no bleeding was noted. The base of the appendix was examined and appeared viable and well-sealed. The right lower quadrant was irrigated moderately with saline until the returning effluent was clear. All ports were removed under direct vision and no bleeding from any port site was noted. The insufflation of the abdomen was evacuated and the laparoscope and the balloon trocar were removed. The fascial incision at the umbilical port site was closed with two  0 Vicryl stitches in order to also repair the hernia. All port sites were infiltrated with Marcaine and closed in a subcuticular fashion. Sterile dressings were applied. The patient's Bullock catheter was removed. The patient was extubated in the Operating Room and transported to the Recovery Room in stable condition. All sponge, instrument and needle counts were correct at the end of the case. I was present and scrubbed for the entire procedure.

## 2022-12-20 NOTE — ED NOTES
LOC: The patient is awake, alert, and oriented to self, place, time, and situation. Pt is calm and cooperative. Affect is appropriate.  Speech is appropriate and clear.     APPEARANCE: Patient resting comfortably in no acute distress.  Patient is clean and well groomed.    SKIN: The skin is warm and dry; color consistent with ethnicity.  Patient has normal skin turgor and moist mucus membranes.  Skin intact; no breakdown or bruising noted.     MUSCULOSKELETAL: Patient moving upper and lower extremities without difficulty; denies pain in the extremities or back.  Denies weakness.     RESPIRATORY: Airway is open and patent. Respirations spontaneous, even, easy, and non-labored.  Patient has a normal effort and rate.  No accessory muscle use noted. Denies cough.     CARDIAC:  Normal rate noted.  No peripheral edema noted. No complaints of chest pain.     ABDOMEN: Abdominal pan reported in RLQ, umbilical region, this area tender to palpation. No distention noted. Denies nausea, vomiting, diarrhea, or constipation.    NEUROLOGIC: Eyes open spontaneously.  Behavior appropriate to situation.  Follows commands; facial expression symmetrical.  Purposeful motor response noted; normal sensation in all extremities. Pt denies headache; denies lightheadedness or dizziness; denies visual disturbances; denies loss of balance; denies unilateral weakness.

## 2022-12-20 NOTE — H&P
Please see consult note dated 12/20/2022 for H&P.      Nhan Helms MD  General Surgery and Surgical Critical Care  [facility]

## 2022-12-20 NOTE — HPI
27M otherwise healthy with one day of periumbilical abdominal pain, nausea, non bilious emesis and anorexia.  AF  WBC is 16  CT shows appendicitis with fecaliths    No previous medical or surgical history    Allergic to PCN    Of note, prior to my arrival he was informed he had appendicitis and then requested to leave AMA and had already signed paperwork. Despite this, I took a complete history and examined him.

## 2022-12-20 NOTE — ANESTHESIA PROCEDURE NOTES
Intubation    Date/Time: 12/20/2022 2:36 PM  Performed by: Amanda Sosa CRNA  Authorized by: Adriano Seay Jr., MD     Intubation:     Induction:  Intravenous    Intubated:  Postinduction    Mask Ventilation:  Easy mask    Attempts:  1    Attempted By:  CRNA    Method of Intubation:  Direct    Laryngeal View Grade: Grade I - full view of cords      Difficult Airway Encountered?: No      Complications:  None    Airway Device:  Oral endotracheal tube    Airway Device Size:  7.0    Style/Cuff Inflation:  Cuffed (inflated to minimal occlusive pressure)    Inflation Amount (mL):  5    Placement Verified By:  Capnometry    Complicating Factors:  None    Findings Post-Intubation:  BS equal bilateral

## 2022-12-20 NOTE — ASSESSMENT & PLAN NOTE
Linus Grant is a 27 y.o. gentleman with appendicitis    - admit to general surgery  - mIVF  - multimodal pain control  - allergy to penicillins, will place on cipro/flagyl  - To OR today for lap appendectomy  - case request placed, will obtain consent later

## 2022-12-20 NOTE — ED PROVIDER NOTES
Encounter Date: 12/20/2022       History     Chief Complaint   Patient presents with    Abdominal Pain     Pt c/o R sided abdominal pain starting yesterday. -N/V/D. -fevers.      HPI    Linus Grant is a 27-year-old male with no significant past medical history presenting with periumbilical abdominal pain now radiating to the right lower quadrant.  His pain initially began yesterday and he was seen in the emergency department yesterday and diagnosed with acute appendicitis with appendicoliths.  He would a leukocytosis of 15,000 yesterday, with 10/10 pain that was severe, constant.  Associated symptoms included abdominal distention, nausea, vomiting and diarrhea.  Denies any symptoms in the past.  Denies any pertinent or major prior medical history.  No previous history of surgeries.  He denies any testicular pain, radiation testicles, or groin pain.  Onset was sudden, associated with right lower quadrant pain that is worsened.  Of note, he had to leave AMA yesterday to take care of work obligations.    Review of patient's allergies indicates:   Allergen Reactions    Penicillins Anaphylaxis     Rash and throat closes     History reviewed. No pertinent past medical history.  History reviewed. No pertinent surgical history.  History reviewed. No pertinent family history.  Social History     Tobacco Use    Smoking status: Never    Smokeless tobacco: Never   Substance Use Topics    Alcohol use: Yes     Comment: occas, not daily    Drug use: Never     Review of Systems   Constitutional:  Negative for chills, fatigue and fever.   HENT:  Negative for congestion and sore throat.    Eyes:  Negative for photophobia and visual disturbance.   Respiratory:  Negative for chest tightness and shortness of breath.    Cardiovascular:  Negative for chest pain and palpitations.   Gastrointestinal:  Positive for abdominal distention, abdominal pain, diarrhea, nausea and vomiting. Negative for blood in stool.   Genitourinary:  Negative for  dysuria, flank pain and genital sores.   Musculoskeletal:  Negative for myalgias and neck pain.   Skin:  Negative for color change and wound.   Neurological:  Negative for weakness and light-headedness.   Psychiatric/Behavioral:  Negative for confusion.      Physical Exam     Initial Vitals [12/20/22 0604]   BP Pulse Resp Temp SpO2   133/73 105 16 98.1 °F (36.7 °C) 100 %      MAP       --         Physical Exam    Nursing note and vitals reviewed.    Constitutional: Well-developed. Well-nourished. Minimal emotional distress.  HENT: OP clear and moist.  NECK: Supple. No cervical LAD.  CARDIAC: RRR. Normal S1/ S2. No murmurs, gallops or rubs. 2+ distal pulses.  PULM: Normal effort. Breath sounds clear - no wheezes, rales, rhonchi.  ABD: Soft, right lower quadrant tenderness, non-distended, normal BS. Negative Perez sign, positive McBurney's point tenderness.  There is guarding guarding, no rebound.  : No CVA tenderness.  Normal genitalia, no inguinal hernia, no testicular pain.  RECTAL: deferred  MS: Full ROM. No edema.   NEURO: Alert and oriented x3., no sensory or motor deficits.  Moving all extremities purposefully.  SKIN: Warm and dry. No rash or lesions.  No cyanosis or jaundice.  PSYCH: Normal judgment. Normal affect.      ED Course   Procedures  Labs Reviewed   CBC W/ AUTO DIFFERENTIAL - Abnormal; Notable for the following components:       Result Value    WBC 20.18 (*)     Gran # (ANC) 16.9 (*)     Immature Grans (Abs) 0.08 (*)     Mono # 1.6 (*)     Gran % 83.7 (*)     Lymph % 7.6 (*)     All other components within normal limits   COMPREHENSIVE METABOLIC PANEL - Abnormal; Notable for the following components:    Sodium 131 (*)     Glucose 128 (*)     All other components within normal limits   URINALYSIS, REFLEX TO URINE CULTURE - Abnormal; Notable for the following components:    Color, UA Colorless (*)     All other components within normal limits    Narrative:     Specimen Source->Urine   LACTIC ACID,  PLASMA          Imaging Results    None          Medications   metronidazole IVPB 500 mg (500 mg Intravenous Given 12/20/22 1440)   ciprofloxacin (CIPRO)400mg/200ml D5W IVPB 400 mg (400 mg Intravenous New Bag 12/20/22 2213)   oxyCODONE immediate release tablet Tab 10 mg (has no administration in time range)   oxyCODONE immediate release tablet 5 mg (has no administration in time range)   acetaminophen tablet 1,000 mg (1,000 mg Oral Given 12/20/22 2157)   HYDROmorphone injection 0.2 mg (1 mg Intravenous Given 12/20/22 1454)   lactated ringers infusion ( Intravenous New Bag 12/20/22 1622)   ibuprofen tablet 400 mg (400 mg Oral Given 12/20/22 2157)   enoxaparin injection 40 mg (40 mg Subcutaneous Given 12/20/22 1950)     Medical Decision Making:   History:   Old Medical Records: I decided to obtain old medical records.  Old Records Summarized: other records.       <> Summary of Records: Records from ED visit yesterday with CT scan evaluated and reviewed shows concern for acute appendicitis with appendicolith.  He would a leukocytosis of 15,000 yesterday, tachycardic, and recommended for admission for appendicitis.  Initial Assessment:   Linus Grant is a 27-year-old male with no significant past medical history presenting with periumbilical abdominal pain now radiating to the right lower quadrant.   Differential Diagnosis:   Appendicitis, perforation, intra-abdominal abscess, sepsis, bacteremia  Independently Interpreted Test(s):   I have ordered and independently interpreted X-rays - see prior notes.  Clinical Tests:   Lab Tests: Ordered and Reviewed  Radiological Study: Reviewed  Other:   I have discussed this case with another health care provider.       <> Summary of the Discussion: General surgery at 6:15 a.m., did discuss admission for acute appendicitis - patient received Cipro and Flagyl yesterday for antibiotics, deferred antibiotics to admitting team.                 Currently afebrile, with tachycardia  without hypotension.  Physical exam findings remarkable for right lower quadrant tenderness, positive McBurney point tenderness with localized peritonitis.  I reviewed his CT imaging from yesterday which on my interpretation shows acute appendicitis with appendicolith.  There is no abscess or perforation.  Full set of labs reobtained including CBC, CMP and lactic acid.  UA without evidence of UTI.  CBC shows leukocytosis of 20,000 with a left shift.  He also has minimal acidosis and a lactate of 2.1.  His symptoms are severe and acute and likely consistent with acute appendicitis.  Discussed case with General surgery, they will admit for likely operative intervention given acute localized peritonitis secondary to acute appendicitis.  Patient agreeable to admission plan.    Complexity: High - level 5           Clinical Impression:   Final diagnoses:  [K35.30] Acute appendicitis with localized peritonitis, without perforation, abscess, or gangrene (Primary)        ED Disposition Condition    Observation                Chip Ryan DO, FAAEM  Emergency Staff Physician   Dept of Emergency Medicine   [facility]  Spectralink: [id number]        Disclaimer: This note has been generated using voice-recognition software. There may be typographical errors that have been missed during proof-reading.       Chip Ryan DO  12/20/22 2156

## 2022-12-20 NOTE — TRANSFER OF CARE
"Anesthesia Transfer of Care Note    Patient: Linus Grant    Procedure(s) Performed: Procedure(s) (LRB):  APPENDECTOMY, LAPAROSCOPIC (N/A)  REPAIR, HERNIA, UMBILICAL    Patient location: PACU    Anesthesia Type: general    Transport from OR: Transported from OR on 6-10 L/min O2 by face mask with adequate spontaneous ventilation    Post pain: adequate analgesia    Post assessment: no apparent anesthetic complications and tolerated procedure well    Post vital signs: stable    Level of consciousness: awake    Nausea/Vomiting: no nausea/vomiting    Complications: none    Transfer of care protocol was followed      Last vitals:   Visit Vitals  /84 (BP Location: Right arm, Patient Position: Lying)   Pulse 94   Temp 37.6 °C (99.6 °F) (Oral)   Resp 20   Ht 5' 10" (1.778 m)   Wt 90.7 kg (200 lb)   SpO2 97%   BMI 28.70 kg/m²     "

## 2022-12-20 NOTE — Clinical Note
Diagnosis: Acute appendicitis with localized peritonitis, without perforation, abscess, or gangrene [[id number]]   Future Attending Provider: MAGNOLIA BRISENO [[id number]]   Admitting Provider:: MAGNOLIA BRISENO [[id number]]

## 2022-12-20 NOTE — HPI
Linus Grant is a 27 y.o. gentleman with no PMH who presents with abdominal pain since yesterday morning. He reports associated nausea and nonbilious emesis. Denies fevers, chills. He presented to the ED yesterday and was found to have appendicitis with fecaliths, but had prior engagements that he had to attend to and left AMA. He returns this morning with worsening abdominal pain and his pain has moved from his umbilicus to his RLQ. His WBC has gone from 16 to 20. He remains nontoxic appearing. Tender in his RLQ, positive rebound.

## 2022-12-20 NOTE — ANESTHESIA PREPROCEDURE EVALUATION
[facility]  Anesthesia Pre-Operative Evaluation         Patient Name: Linus Grant  YOB: 1995  MRN: [id number]    SUBJECTIVE:     Pre-operative evaluation for Procedure(s) (LRB):  APPENDECTOMY, LAPAROSCOPIC (N/A)     12/20/2022    Linus Grant is a 27 y.o. male w/ no significant PMHx presents with appendicitis.     Patient now presents for the above procedure(s).    Anaphylaxis to penicillin    NPO since yesterday    LDA:        Peripheral IV - Single Lumen 12/19/22 1450 20 G Right Antecubital (Active)   Site Assessment Clean;Dry;Intact 12/19/22 1450   Dressing Status Clean;Dry;Intact 12/19/22 1450   Number of days: 0            Peripheral IV - Single Lumen 12/20/22 0630 20 G Left Antecubital (Active)   Site Assessment Clean;Dry;Intact 12/20/22 0630   Line Status Blood return noted;Saline locked;Flushed 12/20/22 0630   Dressing Status Clean;Dry;Intact 12/20/22 0630   Dressing Intervention First dressing 12/20/22 0630   Number of days: 0       Prev airway: None documented.    Drips:    lactated ringers         Patient Active Problem List   Diagnosis    Acute appendicitis with localized peritonitis, without perforation, abscess, or gangrene       Review of patient's allergies indicates:   Allergen Reactions    Penicillins Anaphylaxis     Rash and throat closes       Current Inpatient Medications:   acetaminophen  1,000 mg Oral Q8H    ciprofloxacin  400 mg Intravenous Q8H    ketorolac  15 mg Intravenous Q8H    metronidazole  500 mg Intravenous Q8H       No current facility-administered medications on file prior to encounter.     No current outpatient medications on file prior to encounter.       History reviewed. No pertinent surgical history.    Social History     Socioeconomic History    Marital status: Single   Tobacco Use    Smoking status: Never    Smokeless tobacco: Never   Substance and Sexual Activity    Alcohol use: Yes     Comment: occas, not daily     Drug use: Never    Sexual activity: Not Currently       OBJECTIVE:     Vital Signs Range (Last 24H):  Temp:  [36.3 °C (97.3 °F)-36.9 °C (98.5 °F)]   Pulse:  []   Resp:  [16-20]   BP: (133-152)/(70-80)   SpO2:  [97 %-100 %]       Significant Labs:  Lab Results   Component Value Date    WBC 20.18 (H) 12/20/2022    HGB 16.5 12/20/2022    HCT 47.0 12/20/2022     12/20/2022    ALT 18 12/20/2022    AST 16 12/20/2022     (L) 12/20/2022    K 3.8 12/20/2022    CL 97 12/20/2022    CREATININE 0.9 12/20/2022    BUN 7 12/20/2022    CO2 24 12/20/2022       Diagnostic Studies: No relevant studies.    EKG:   No results found for this or any previous visit.    2D ECHO:  TTE:  No results found for this or any previous visit.    JUAN LUIS:  No results found for this or any previous visit.    ASSESSMENT/PLAN:         Pre-op Assessment    I have reviewed the Patient Summary Reports.     I have reviewed the Nursing Notes. I have reviewed the NPO Status.   I have reviewed the Medications.     Review of Systems  Anesthesia Hx:  No problems with previous Anesthesia  History of prior surgery of interest to airway management or planning: Denies Family Hx of Anesthesia complications.   Denies Personal Hx of Anesthesia complications.   Social:  Non-Smoker, No Alcohol Use    Hematology/Oncology:  Hematology Normal   Oncology Normal     EENT/Dental:EENT/Dental Normal   Cardiovascular:   Exercise tolerance: good Denies CAD.    Denies Dysrhythmias.     Pulmonary:   Denies COPD.  Denies Sleep Apnea.    Hepatic/GI:   Denies GERD.    Neurological:   Denies Neuromuscular Disease.    Endocrine:   Denies Diabetes.    Psych:   Denies Psychiatric History.          Physical Exam  General: Well nourished, Cooperative, Alert and Oriented    Airway:  Mallampati: II   Mouth Opening: Normal  TM Distance: Normal  Tongue: Normal  Neck ROM: Normal ROM    Dental:  Intact    Chest/Lungs:  Clear to auscultation, Normal Respiratory  Rate    Heart:  Rate: Normal  Rhythm: Regular Rhythm  Sounds: Normal    Abdomen:  Nontender, Soft, Normal        Anesthesia Plan  Type of Anesthesia, risks & benefits discussed:    Anesthesia Type: Gen ETT  Intra-op Monitoring Plan: Standard ASA Monitors  Post Op Pain Control Plan: multimodal analgesia  Induction:  IV  Airway Plan: Direct, Post-Induction  Informed Consent: Informed consent signed with the Patient and all parties understand the risks and agree with anesthesia plan.  All questions answered.   ASA Score: 2 Emergent  Day of Surgery Review of History & Physical: H&P Update referred to the surgeon/provider.    Ready For Surgery From Anesthesia Perspective.     .

## 2022-12-20 NOTE — BRIEF OP NOTE
[facility] - Surgery ([facility])  Brief Operative Note    SUMMARY     Surgery Date: 12/20/2022     Surgeon(s) and Role:     * Nhan Helms MD - Primary     * Gregorio Henao MD - Resident - Assisting    Pre-op Diagnosis:  Acute appendicitis with localized peritonitis, without perforation, abscess, or gangrene [K35.30]    Post-op Diagnosis:  Post-Op Diagnosis Codes:     * Acute appendicitis with localized peritonitis, without perforation, abscess, or gangrene [K35.30]    Procedure(s) (LRB):  APPENDECTOMY, LAPAROSCOPIC (N/A)  REPAIR, HERNIA, UMBILICAL    Anesthesia: Choice    Operative Findings: Laparoscopic appendectomy performed and pt found to have perforated appendicitis. Small, fat-containing umbilical hernia repaired primarily.    Estimated Blood Loss: 25 mL    Estimated Blood Loss has been documented.         Specimens:   Specimen (24h ago, onward)      None            [id number]

## 2022-12-20 NOTE — ED NOTES
Assumed care of patient. Patient on automatic blood pressure cuff and continuous pulse oximeter. VSS. AAOx3. Pt resting comfortably in stretcher. Respirations even and unlabored. Side rails up and bed in lowest position. Call light in reach. Pt in hospital gown and all belongings in personal belongings bag.

## 2022-12-20 NOTE — SUBJECTIVE & OBJECTIVE
No current facility-administered medications on file prior to encounter.     No current outpatient medications on file prior to encounter.       Review of patient's allergies indicates:   Allergen Reactions    Penicillins Anaphylaxis     Rash and throat closes       History reviewed. No pertinent past medical history.  History reviewed. No pertinent surgical history.  Family History    None       Tobacco Use    Smoking status: Never    Smokeless tobacco: Never   Substance and Sexual Activity    Alcohol use: Yes     Comment: occas, not daily    Drug use: Never    Sexual activity: Not on file     Review of Systems   Constitutional:  Positive for activity change and appetite change. Negative for chills and fever.   HENT: Negative.     Eyes: Negative.    Respiratory: Negative.  Negative for cough and shortness of breath.    Cardiovascular: Negative.  Negative for chest pain and palpitations.   Gastrointestinal:  Positive for abdominal pain, nausea and vomiting. Negative for abdominal distention, constipation and diarrhea.   Endocrine: Negative.    Genitourinary: Negative.  Negative for dysuria.   Musculoskeletal: Negative.    Skin: Negative.    Allergic/Immunologic: Negative.    Neurological: Negative.    Hematological: Negative.    Psychiatric/Behavioral: Negative.     Objective:     Vital Signs (Most Recent):  Temp: 98.5 °F (36.9 °C) (12/19/22 1752)  Pulse: 77 (12/19/22 1752)  Resp: 18 (12/19/22 1752)  BP: (!) 152/72 (12/19/22 1752)  SpO2: 99 % (12/19/22 1752)   Vital Signs (24h Range):  Temp:  [97.3 °F (36.3 °C)-98.5 °F (36.9 °C)] 98.5 °F (36.9 °C)  Pulse:  [57-77] 77  Resp:  [16-18] 18  SpO2:  [99 %-100 %] 99 %  BP: (133-152)/(72-75) 152/72     Weight: 90.7 kg (200 lb)  Body mass index is 28.7 kg/m².    Physical Exam  Vitals reviewed.   Constitutional:       Appearance: Normal appearance.   HENT:      Head: Normocephalic.   Cardiovascular:      Rate and Rhythm: Normal rate.   Pulmonary:      Effort: Pulmonary effort  is normal. No respiratory distress.   Abdominal:      General: Abdomen is flat. There is no distension.      Tenderness: There is no abdominal tenderness.      Hernia: No hernia is present.      Comments: Non tender  He does not have RLQ pain yet   Musculoskeletal:      Cervical back: No rigidity.   Skin:     General: Skin is warm and dry.      Capillary Refill: Capillary refill takes less than 2 seconds.   Neurological:      General: No focal deficit present.      Mental Status: He is alert and oriented to person, place, and time.   Psychiatric:         Mood and Affect: Mood normal.       Significant Labs:  I have reviewed all pertinent lab results within the past 24 hours.  CBC:   Recent Labs   Lab 12/19/22  1450   WBC 15.93*   RBC 5.36   HGB 15.6   HCT 46.6      MCV 87   MCH 29.1   MCHC 33.5     CMP:   Recent Labs   Lab 12/19/22  1450   *   CALCIUM 9.2   ALBUMIN 4.4   PROT 7.8   *   K 4.2   CO2 23      BUN 8   CREATININE 0.7   ALKPHOS 70   ALT 20   AST 18   BILITOT 0.4       Significant Diagnostics:  I have reviewed all pertinent imaging results/findings within the past 24 hours.    CT shows dilated appendix, surrounding inflammation with fecaliths in the tip

## 2022-12-20 NOTE — ASSESSMENT & PLAN NOTE
27M otherwise healthy with early appendicitis with fecaliths confirmed on CT with associated leukocytosis of 16k.  Had already signed out AMA just prior to my arrival at bedside  I explained to him the considerable risks he was taking by leaving without surgery  He understands that he risks a prolonged course in the hospital if the appendix were to rupture, and could even die if he does not have surgery  He insists on going home to talk to his boss about the operation face to face; a phone call will apparently not suffice  I advised him that this is an urgent operation and that he should prioritize his health over his work at this time  He is intransigent  I felt like I did all I could to convince him to stay and that he is in sound mind, competent to make decisions for himself  He does say that he will come back tomorrow to get the surgery  Will see  We will be here

## 2022-12-20 NOTE — CONSULTS
[facility] Dept  General Surgery  Consult Note    Patient Name: Linus Grant  MRN: [id number]  Code Status: Full Code  Admission Date: 12/19/2022  Hospital Length of Stay: 0 days  Attending Physician: Nhan Helms MD  Primary Care Provider: No primary care provider on file.    Patient information was obtained from patient and ER records.     Inpatient consult to General surgery  Consult performed by: STEVE Berg MD  Consult ordered by: Toño Ambriz MD  Reason for consult: appendicitis        Subjective:     Principal Problem: acute appendicitis    History of Present Illness: 27M otherwise healthy with one day of periumbilical abdominal pain, nausea, non bilious emesis and anorexia.  AF  WBC is 16  CT shows appendicitis with fecaliths    No previous medical or surgical history    Allergic to PCN    Of note, prior to my arrival he was informed he had appendicitis and then requested to leave AMA and had already signed paperwork. Despite this, I took a complete history and examined him.      No current facility-administered medications on file prior to encounter.     No current outpatient medications on file prior to encounter.       Review of patient's allergies indicates:   Allergen Reactions    Penicillins Anaphylaxis     Rash and throat closes       History reviewed. No pertinent past medical history.  History reviewed. No pertinent surgical history.  Family History    None       Tobacco Use    Smoking status: Never    Smokeless tobacco: Never   Substance and Sexual Activity    Alcohol use: Yes     Comment: occas, not daily    Drug use: Never    Sexual activity: Not on file     Review of Systems   Constitutional:  Positive for activity change and appetite change. Negative for chills and fever.   HENT: Negative.     Eyes: Negative.    Respiratory: Negative.  Negative for cough and shortness of breath.    Cardiovascular: Negative.  Negative for chest pain and palpitations.    Gastrointestinal:  Positive for abdominal pain, nausea and vomiting. Negative for abdominal distention, constipation and diarrhea.   Endocrine: Negative.    Genitourinary: Negative.  Negative for dysuria.   Musculoskeletal: Negative.    Skin: Negative.    Allergic/Immunologic: Negative.    Neurological: Negative.    Hematological: Negative.    Psychiatric/Behavioral: Negative.     Objective:     Vital Signs (Most Recent):  Temp: 98.5 °F (36.9 °C) (12/19/22 1752)  Pulse: 77 (12/19/22 1752)  Resp: 18 (12/19/22 1752)  BP: (!) 152/72 (12/19/22 1752)  SpO2: 99 % (12/19/22 1752)   Vital Signs (24h Range):  Temp:  [97.3 °F (36.3 °C)-98.5 °F (36.9 °C)] 98.5 °F (36.9 °C)  Pulse:  [57-77] 77  Resp:  [16-18] 18  SpO2:  [99 %-100 %] 99 %  BP: (133-152)/(72-75) 152/72     Weight: 90.7 kg (200 lb)  Body mass index is 28.7 kg/m².    Physical Exam  Vitals reviewed.   Constitutional:       Appearance: Normal appearance.   HENT:      Head: Normocephalic.   Cardiovascular:      Rate and Rhythm: Normal rate.   Pulmonary:      Effort: Pulmonary effort is normal. No respiratory distress.   Abdominal:      General: Abdomen is flat. There is no distension.      Tenderness: There is no abdominal tenderness.      Hernia: No hernia is present.      Comments: Non tender  He does not have RLQ pain yet   Musculoskeletal:      Cervical back: No rigidity.   Skin:     General: Skin is warm and dry.      Capillary Refill: Capillary refill takes less than 2 seconds.   Neurological:      General: No focal deficit present.      Mental Status: He is alert and oriented to person, place, and time.   Psychiatric:         Mood and Affect: Mood normal.       Significant Labs:  I have reviewed all pertinent lab results within the past 24 hours.  CBC:   Recent Labs   Lab 12/19/22  1450   WBC 15.93*   RBC 5.36   HGB 15.6   HCT 46.6      MCV 87   MCH 29.1   MCHC 33.5     CMP:   Recent Labs   Lab 12/19/22  1450   *   CALCIUM 9.2   ALBUMIN 4.4    PROT 7.8   *   K 4.2   CO2 23      BUN 8   CREATININE 0.7   ALKPHOS 70   ALT 20   AST 18   BILITOT 0.4       Significant Diagnostics:  I have reviewed all pertinent imaging results/findings within the past 24 hours.    CT shows dilated appendix, surrounding inflammation with fecaliths in the tip      Assessment/Plan:     Acute appendicitis with localized peritonitis, without perforation, abscess, or gangrene  27M otherwise healthy with early appendicitis with fecaliths confirmed on CT with associated leukocytosis of 16k.  Had already signed out AMA just prior to my arrival at bedside  I explained to him the considerable risks he was taking by leaving without surgery  He understands that he risks a prolonged course in the hospital if the appendix were to rupture, and could even die if he does not have surgery  He insists on going home to talk to his boss about the operation face to face; a phone call will apparently not suffice  I advised him that this is an urgent operation and that he should prioritize his health over his work at this time. I told him that I felt he was making a grave error.  He is intransigent  I felt like I did all I could to convince him to stay and that he is in sound mind, competent to make decisions for himself  He does say that he will come back tomorrow to get the surgery   Will see  We will be here    This conversation was had via video  with Kalpesh #[id number].          VTE Risk Mitigation (From admission, onward)         Ordered     Place sequential compression device  Until discontinued         12/19/22 1711     IP VTE LOW RISK PATIENT  Once         12/19/22 1711                Thank you for your consult. I will sign off. Please contact us if you have any additional questions.    BRIAN Berg MD  General Surgery  [facility]

## 2022-12-20 NOTE — ED NOTES
Linus Grant, a 27 y.o. male presents to the ED w/ complaint of was diagnosed with appendicitis yesterday. RLQ pain increased today. Denies fever. Primarily Hungarian speaking- needs .    Triage note:  Chief Complaint   Patient presents with    Abdominal Pain     Pt c/o R sided abdominal pain starting yesterday. -N/V/D. -fevers.      Review of patient's allergies indicates:   Allergen Reactions    Penicillins Anaphylaxis     Rash and throat closes     No past medical history on file.

## 2022-12-20 NOTE — ED NOTES
Surgery with use of  attempted to get pt to revoke his AMA.  Pt  insistent he has to speak with his employer. Risk very clearly discussed.  IV discontinued. Pt left.

## 2022-12-20 NOTE — ED NOTES
Pt transported to [facility] via stretcher with transport. All belongings with pt.

## 2022-12-20 NOTE — NURSING TRANSFER
Nursing Transfer Note      12/20/2022     Reason patient is being transferred: post procedure    Transfer To: 502    Transfer via bed    Transfer with n/a    Transported by transport    Medicines sent: n/a    Any special needs or follow-up needed: n/a    Chart send with patient: Yes    Notified: friend    Patient reassessed at: 12/20/22 @0223

## 2022-12-20 NOTE — CONSULTS
[facility] - Emergency Dept  General Surgery  Consult Note    Patient Name: Linus Grant  MRN: [id number]  Code Status: Prior  Admission Date: 12/20/2022  Hospital Length of Stay: 0 days  Attending Physician: Nhan Helms MD  Primary Care Provider: Primary Doctor No    Patient information was obtained from patient and ER records.     Inpatient consult to General Surgery  Consult performed by: Gregorio Henao MD  Consult ordered by: Gregorio Henao MD        Subjective:     Principal Problem: Acute appendicitis with localized peritonitis, without perforation, abscess, or gangrene    History of Present Illness: Linus Grant is a 27 y.o. gentleman with no PMH who presents with abdominal pain since yesterday morning. He reports associated nausea and nonbilious emesis. Denies fevers, chills. He presented to the ED yesterday and was found to have appendicitis with fecaliths, but had prior engagements that he had to attend to and left AMA. He returns this morning with worsening abdominal pain and his pain has moved from his umbilicus to his RLQ. His WBC has gone from 16 to 20. He remains nontoxic appearing. Tender in his RLQ, positive rebound.         Current Facility-Administered Medications on File Prior to Encounter   Medication    [COMPLETED] ciprofloxacin (CIPRO)400mg/200ml D5W IVPB 400 mg    [COMPLETED] dicyclomine injection 20 mg    [COMPLETED] HYDROmorphone injection 0.5 mg    [COMPLETED] lactated ringers bolus 1,000 mL    [COMPLETED] ondansetron injection 4 mg    [DISCONTINUED] acetaminophen tablet 650 mg    [DISCONTINUED] ciprofloxacin (CIPRO)400mg/200ml D5W IVPB 400 mg    [DISCONTINUED] lactated ringers infusion    [DISCONTINUED] LIDOcaine (PF) 10 mg/ml (1%) injection 10 mg    [DISCONTINUED] melatonin tablet 6 mg    [DISCONTINUED] metronidazole IVPB 500 mg    [DISCONTINUED] metronidazole IVPB 500 mg    [DISCONTINUED] ondansetron injection 4 mg    [DISCONTINUED] ondansetron injection 4 mg     [DISCONTINUED] sodium chloride 0.9% flush 10 mL     No current outpatient medications on file prior to encounter.       Review of patient's allergies indicates:   Allergen Reactions    Penicillins Anaphylaxis     Rash and throat closes       History reviewed. No pertinent past medical history.  History reviewed. No pertinent surgical history.  Family History    None       Tobacco Use    Smoking status: Never    Smokeless tobacco: Never   Substance and Sexual Activity    Alcohol use: Yes     Comment: occas, not daily    Drug use: Never    Sexual activity: Not Currently     Review of Systems   Constitutional:  Negative for chills and fever.   HENT:  Negative for hearing loss and sore throat.    Eyes:  Negative for discharge and visual disturbance.   Respiratory:  Negative for chest tightness and shortness of breath.    Cardiovascular:  Negative for chest pain and leg swelling.   Gastrointestinal:  Positive for abdominal pain, nausea and vomiting. Negative for abdominal distention.   Genitourinary:  Negative for difficulty urinating and hematuria.   Musculoskeletal:  Negative for back pain and joint swelling.   Skin:  Negative for color change and rash.   Neurological:  Negative for numbness and headaches.   Objective:     Vital Signs (Most Recent):  Temp: 98.1 °F (36.7 °C) (12/20/22 0604)  Pulse: 85 (12/20/22 0802)  Resp: 18 (12/20/22 0702)  BP: (!) 140/78 (12/20/22 0802)  SpO2: 99 % (12/20/22 0802)   Vital Signs (24h Range):  Temp:  [97.3 °F (36.3 °C)-98.5 °F (36.9 °C)] 98.1 °F (36.7 °C)  Pulse:  [] 85  Resp:  [16-18] 18  SpO2:  [97 %-100 %] 99 %  BP: (133-152)/(72-80) 140/78     Weight: 90.7 kg (200 lb)  Body mass index is 28.7 kg/m².    Physical Exam  Constitutional:       General: He is not in acute distress.     Appearance: Normal appearance.   HENT:      Head: Normocephalic and atraumatic.   Cardiovascular:      Rate and Rhythm: Normal rate and regular rhythm.   Pulmonary:      Effort: Pulmonary  effort is normal. No respiratory distress.      Breath sounds: Normal breath sounds.   Abdominal:      General: Abdomen is flat. There is no distension.      Palpations: Abdomen is soft.      Tenderness: There is abdominal tenderness.      Comments: RLQ tenderness. + rebound tenderness   Neurological:      General: No focal deficit present.      Mental Status: He is alert and oriented to person, place, and time.   Psychiatric:         Behavior: Behavior normal.         Thought Content: Thought content normal.       Significant Labs:  I have reviewed all pertinent lab results within the past 24 hours.  CBC:   Recent Labs   Lab 12/20/22 0624   WBC 20.18*   RBC 5.58   HGB 16.5   HCT 47.0      MCV 84   MCH 29.6   MCHC 35.1     CMP:   Recent Labs   Lab 12/20/22 0624   *   CALCIUM 9.3   ALBUMIN 4.2   PROT 7.7   *   K 3.8   CO2 24   CL 97   BUN 7   CREATININE 0.9   ALKPHOS 67   ALT 18   AST 16   BILITOT 0.8       Significant Diagnostics:  I have reviewed all pertinent imaging results/findings within the past 24 hours.      Assessment/Plan:     * Acute appendicitis with localized peritonitis, without perforation, abscess, or gangrene  Linus Grant is a 27 y.o. gentleman with appendicitis    - admit to general surgery  - mIVF  - multimodal pain control  - allergy to penicillins, will place on cipro/flagyl  - To OR today for lap appendectomy  - case request placed, will obtain consent later      VTE Risk Mitigation (From admission, onward)    None          Thank you for your consult. I will follow-up with patient. Please contact us if you have any additional questions.    Gregorio Henao MD  General Surgery  [facility]

## 2022-12-20 NOTE — SUBJECTIVE & OBJECTIVE
Current Facility-Administered Medications on File Prior to Encounter   Medication    [COMPLETED] ciprofloxacin (CIPRO)400mg/200ml D5W IVPB 400 mg    [COMPLETED] dicyclomine injection 20 mg    [COMPLETED] HYDROmorphone injection 0.5 mg    [COMPLETED] lactated ringers bolus 1,000 mL    [COMPLETED] ondansetron injection 4 mg    [DISCONTINUED] acetaminophen tablet 650 mg    [DISCONTINUED] ciprofloxacin (CIPRO)400mg/200ml D5W IVPB 400 mg    [DISCONTINUED] lactated ringers infusion    [DISCONTINUED] LIDOcaine (PF) 10 mg/ml (1%) injection 10 mg    [DISCONTINUED] melatonin tablet 6 mg    [DISCONTINUED] metronidazole IVPB 500 mg    [DISCONTINUED] metronidazole IVPB 500 mg    [DISCONTINUED] ondansetron injection 4 mg    [DISCONTINUED] ondansetron injection 4 mg    [DISCONTINUED] sodium chloride 0.9% flush 10 mL     No current outpatient medications on file prior to encounter.       Review of patient's allergies indicates:   Allergen Reactions    Penicillins Anaphylaxis     Rash and throat closes       History reviewed. No pertinent past medical history.  History reviewed. No pertinent surgical history.  Family History    None       Tobacco Use    Smoking status: Never    Smokeless tobacco: Never   Substance and Sexual Activity    Alcohol use: Yes     Comment: occas, not daily    Drug use: Never    Sexual activity: Not Currently     Review of Systems   Constitutional:  Negative for chills and fever.   HENT:  Negative for hearing loss and sore throat.    Eyes:  Negative for discharge and visual disturbance.   Respiratory:  Negative for chest tightness and shortness of breath.    Cardiovascular:  Negative for chest pain and leg swelling.   Gastrointestinal:  Positive for abdominal pain, nausea and vomiting. Negative for abdominal distention.   Genitourinary:  Negative for difficulty urinating and hematuria.   Musculoskeletal:  Negative for back pain and joint swelling.   Skin:  Negative for color change and rash.   Neurological:   Negative for numbness and headaches.   Objective:     Vital Signs (Most Recent):  Temp: 98.1 °F (36.7 °C) (12/20/22 0604)  Pulse: 85 (12/20/22 0802)  Resp: 18 (12/20/22 0702)  BP: (!) 140/78 (12/20/22 0802)  SpO2: 99 % (12/20/22 0802)   Vital Signs (24h Range):  Temp:  [97.3 °F (36.3 °C)-98.5 °F (36.9 °C)] 98.1 °F (36.7 °C)  Pulse:  [] 85  Resp:  [16-18] 18  SpO2:  [97 %-100 %] 99 %  BP: (133-152)/(72-80) 140/78     Weight: 90.7 kg (200 lb)  Body mass index is 28.7 kg/m².    Physical Exam  Constitutional:       General: He is not in acute distress.     Appearance: Normal appearance.   HENT:      Head: Normocephalic and atraumatic.   Cardiovascular:      Rate and Rhythm: Normal rate and regular rhythm.   Pulmonary:      Effort: Pulmonary effort is normal. No respiratory distress.      Breath sounds: Normal breath sounds.   Abdominal:      General: Abdomen is flat. There is no distension.      Palpations: Abdomen is soft.      Tenderness: There is abdominal tenderness.      Comments: RLQ tenderness. + rebound tenderness   Neurological:      General: No focal deficit present.      Mental Status: He is alert and oriented to person, place, and time.   Psychiatric:         Behavior: Behavior normal.         Thought Content: Thought content normal.       Significant Labs:  I have reviewed all pertinent lab results within the past 24 hours.  CBC:   Recent Labs   Lab 12/20/22 0624   WBC 20.18*   RBC 5.58   HGB 16.5   HCT 47.0      MCV 84   MCH 29.6   MCHC 35.1     CMP:   Recent Labs   Lab 12/20/22 0624   *   CALCIUM 9.3   ALBUMIN 4.2   PROT 7.7   *   K 3.8   CO2 24   CL 97   BUN 7   CREATININE 0.9   ALKPHOS 67   ALT 18   AST 16   BILITOT 0.8       Significant Diagnostics:  I have reviewed all pertinent imaging results/findings within the past 24 hours.

## 2022-12-21 VITALS
WEIGHT: 202.19 LBS | HEART RATE: 78 BPM | RESPIRATION RATE: 18 BRPM | SYSTOLIC BLOOD PRESSURE: 117 MMHG | BODY MASS INDEX: 28.95 KG/M2 | HEIGHT: 70 IN | OXYGEN SATURATION: 98 % | TEMPERATURE: 98 F | DIASTOLIC BLOOD PRESSURE: 61 MMHG

## 2022-12-21 PROCEDURE — 96366 THER/PROPH/DIAG IV INF ADDON: CPT

## 2022-12-21 PROCEDURE — S0030 INJECTION, METRONIDAZOLE: HCPCS | Performed by: STUDENT IN AN ORGANIZED HEALTH CARE EDUCATION/TRAINING PROGRAM

## 2022-12-21 PROCEDURE — G0378 HOSPITAL OBSERVATION PER HR: HCPCS

## 2022-12-21 PROCEDURE — 96361 HYDRATE IV INFUSION ADD-ON: CPT

## 2022-12-21 PROCEDURE — 25000003 PHARM REV CODE 250: Performed by: STUDENT IN AN ORGANIZED HEALTH CARE EDUCATION/TRAINING PROGRAM

## 2022-12-21 PROCEDURE — 63600175 PHARM REV CODE 636 W HCPCS: Performed by: STUDENT IN AN ORGANIZED HEALTH CARE EDUCATION/TRAINING PROGRAM

## 2022-12-21 RX ORDER — METRONIDAZOLE 500 MG/1
500 TABLET ORAL EVERY 8 HOURS
Status: DISCONTINUED | OUTPATIENT
Start: 2022-12-21 | End: 2022-12-21 | Stop reason: HOSPADM

## 2022-12-21 RX ORDER — OXYCODONE HYDROCHLORIDE 5 MG/1
5 TABLET ORAL EVERY 6 HOURS PRN
Qty: 10 TABLET | Refills: 0 | Status: SHIPPED | OUTPATIENT
Start: 2022-12-21

## 2022-12-21 RX ORDER — CIPROFLOXACIN 500 MG/1
500 TABLET ORAL EVERY 12 HOURS
Status: DISCONTINUED | OUTPATIENT
Start: 2022-12-21 | End: 2022-12-21 | Stop reason: HOSPADM

## 2022-12-21 RX ORDER — CIPROFLOXACIN 500 MG/1
500 TABLET ORAL EVERY 12 HOURS
Qty: 10 TABLET | Refills: 0 | Status: SHIPPED | OUTPATIENT
Start: 2022-12-21 | End: 2022-12-26

## 2022-12-21 RX ORDER — METRONIDAZOLE 500 MG/1
500 TABLET ORAL EVERY 8 HOURS
Qty: 15 TABLET | Refills: 0 | Status: SHIPPED | OUTPATIENT
Start: 2022-12-21 | End: 2022-12-26

## 2022-12-21 RX ADMIN — METRONIDAZOLE 500 MG: 500 INJECTION, SOLUTION INTRAVENOUS at 06:12

## 2022-12-21 RX ADMIN — CIPROFLOXACIN 400 MG: 2 INJECTION, SOLUTION INTRAVENOUS at 05:12

## 2022-12-21 RX ADMIN — ACETAMINOPHEN 1000 MG: 500 TABLET ORAL at 05:12

## 2022-12-21 RX ADMIN — IBUPROFEN 400 MG: 400 TABLET, FILM COATED ORAL at 05:12

## 2022-12-21 NOTE — DISCHARGE INSTRUCTIONS
Surgery Post Op Instructions:    You had a laparoscopic appendectomy performed 12/20/22.     Wound care:  Your incision is covered with Dermabond, a type of surgical super glue. You may shower tomorrow - let soapy water run over the incision but do not scrub the area. You must avoid submerging the incision in pools, bathtubs, etc until it is fully healed in 4-6 weeks.     You need to limit yourself to light duty activities (no lifting/pulling/pushing >10 lbs) for a total of 6 weeks after surgery.    No dietary restrictions.    Medications:  A narcotic pain medication has been prescribed.  Please start to wean the pain medication over the following few days.  You can take tylenol instead of the pain medication.      Please take miralax 1 capful at night to prevent constipation associated with narcotic pain medications.      Follow up:  Return to clinic in 2 weeks for follow up and wound check.      Instrucciones postoperatorias de la cirugía:    Le realizaron swati apendicectomía laparoscópica el 12/20/22.     Cuidado de heridas:  Rousseau incisión se cubre con Dermabond, un tipo de superpegamento quirúrgico. Puede ducharse mañana; deje correr agua jabonosa sobre la incisión, nan no frote el área. Debe evitar sumergir la incisión en piscinas, bañeras, etc. hasta que esté completamente curada en 4-6 semanas.    Debe limitarse a actividades livianas (sin levantar/jalar/empujar más de 10 libras) esther un total de 6 semanas después de la cirugía.  Sin restricciones dietéticas.    Medicamentos:  Se ha recetado un analgésico narcótico. Comience a dejar de mary el medicamento para el dolor esther los siguientes días. Puede mary tylenol en lugar del medicamento para el dolor.    [place] miralax 1 tapón por la noche para prevenir el estreñimiento asociado con los analgésicos narcóticos.    Seguimiento:  Regrese a la clínica en 2 semanas para seguimiento y control de heridas.

## 2022-12-21 NOTE — HOSPITAL COURSE
Linus Grant presented to [facility] on the morning of 12/20/2022 for appendectomy due to acute appendicitis. The procedure was performed without complication and patient was transferred to the floor for further post op care and monitoring. Their postoperative course was uneventful and patient progressed according to plan. Pain was controlled with a combination of IV and PO narcotic pain medications. Diet has been advanced throughout the hospital stay. Patient is now ambulating without assistance, tolerating a regular diet, pain is well controlled with PO pain medication, and is voiding appropriately. Patient now meets all criteria for discharge. Will send home with a 5 day course of antibiotics

## 2022-12-21 NOTE — PLAN OF CARE
[facility]  Discharge Final Note    Primary Care Provider: Primary Doctor No    Expected Discharge Date: 12/21/2022    Final Discharge Note (most recent)       Final Note - 12/21/22 1308          Final Note    Assessment Type Final Discharge Note     Anticipated Discharge Disposition Home or Self Care     What phone number can be called within the next 1-3 days to see how you are doing after discharge? --   [phone]                    Important Message from Medicare             Contact Info       Nhan Helms MD   Specialty: General Surgery    [address]  [facility] [address]   Phone: [phone]       Next Steps: Follow up in 2 week(s)    Instructions: Post-op          Future Appointments   Date Time Provider Department Center   1/4/2023 10:15 AM Nhan Helms MD [facility]     Patient discharged home to care of family on 12/21/22.

## 2022-12-21 NOTE — SUBJECTIVE & OBJECTIVE
Interval History: No issues overnight. Tolerating a regular diet with well controlled pain.    Medications:  Continuous Infusions:  Scheduled Meds:   acetaminophen  1,000 mg Oral Q8H    ciprofloxacin HCl  500 mg Oral Q12H    enoxaparin  40 mg Subcutaneous Daily    ibuprofen  400 mg Oral Q8H    metroNIDAZOLE  500 mg Oral Q8H     PRN Meds:HYDROmorphone, oxyCODONE, oxyCODONE     Review of patient's allergies indicates:   Allergen Reactions    Penicillins Anaphylaxis     Rash and throat closes     Objective:     Vital Signs (Most Recent):  Temp: 99.1 °F (37.3 °C) (12/21/22 0444)  Pulse: 80 (12/21/22 0444)  Resp: 18 (12/21/22 0444)  BP: (!) 102/53 (12/21/22 0444)  SpO2: 97 % (12/21/22 0444)   Vital Signs (24h Range):  Temp:  [98.1 °F (36.7 °C)-99.6 °F (37.6 °C)] 99.1 °F (37.3 °C)  Pulse:  [2-96] 80  Resp:  [15-21] 18  SpO2:  [94 %-100 %] 97 %  BP: (102-139)/(53-84) 102/53     Weight: 91.7 kg (202 lb 2.6 oz)  Body mass index is 29.01 kg/m².    Intake/Output - Last 3 Shifts         12/19 0700  12/20 0659 12/20 0700  12/21 0659 12/21 0700  12/22 0659    IV Piggyback  750     Total Intake(mL/kg)  750 (8.2)     Urine (mL/kg/hr)  830 (0.4)     Blood  25     Total Output  855     Net  -105            Urine Occurrence  2 x             Physical Exam  Vitals and nursing note reviewed.   Constitutional:       General: He is not in acute distress.  Cardiovascular:      Rate and Rhythm: Normal rate.      Pulses: Normal pulses.   Pulmonary:      Effort: Pulmonary effort is normal. No respiratory distress.   Abdominal:      General: There is no distension.      Palpations: Abdomen is soft.      Tenderness: There is no abdominal tenderness.      Comments: Incisions clean/dry; dermabond in place; soft non-tender and non-distended abdomen    Neurological:      General: No focal deficit present.      Mental Status: He is alert and oriented to person, place, and time.       Significant Labs:  I have reviewed all pertinent lab results within  the past 24 hours.  CBC:   Recent Labs   Lab 12/20/22 0624   WBC 20.18*   RBC 5.58   HGB 16.5   HCT 47.0      MCV 84   MCH 29.6   MCHC 35.1     BMP:   Recent Labs   Lab 12/20/22 0624   *   *   K 3.8   CL 97   CO2 24   BUN 7   CREATININE 0.9   CALCIUM 9.3     CMP:   Recent Labs   Lab 12/20/22 0624   *   CALCIUM 9.3   ALBUMIN 4.2   PROT 7.7   *   K 3.8   CO2 24   CL 97   BUN 7   CREATININE 0.9   ALKPHOS 67   ALT 18   AST 16   BILITOT 0.8       Significant Diagnostics:  I have reviewed all pertinent imaging results/findings within the past 24 hours.

## 2022-12-21 NOTE — PLAN OF CARE
[facility]  Discharge Assessment    Primary Care Provider: Primary Doctor No     Discharge Assessment (most recent)       BRIEF DISCHARGE ASSESSMENT - 12/21/22 0932          Discharge Planning    Assessment Type Discharge Planning Brief Assessment     Resource/Environmental Concerns none     Support Systems Family members     Equipment Currently Used at Home none     Current Living Arrangements home     Patient/Family Anticipates Transition to home with family     Patient/Family Anticipated Services at Transition none     DME Needed Upon Discharge  none     Discharge Plan A Home with family     Discharge Plan B Home with family                   Spoke with patient at bedside using  to complete d/c planning assessment. Patient lives with his aunt and cousin in a single story home without steps or DME. Patient uninsured. E-mail sent to [facility] department requesting a medicaid screening for patient. No d/c needs noted. Awaiting ride from cousin to return home.

## 2022-12-21 NOTE — PROGRESS NOTES
[facility] - [facility]  General Surgery  Progress Note    Subjective:     History of Present Illness:  Linus Grant is a 27 y.o. gentleman with no PMH who presents with abdominal pain since yesterday morning. He reports associated nausea and nonbilious emesis. Denies fevers, chills. He presented to the ED yesterday and was found to have appendicitis with fecaliths, but had prior engagements that he had to attend to and left AMA. He returns this morning with worsening abdominal pain and his pain has moved from his umbilicus to his RLQ. His WBC has gone from 16 to 20. He remains nontoxic appearing. Tender in his RLQ, positive rebound.         Post-Op Info:  Procedure(s) (LRB):  APPENDECTOMY, LAPAROSCOPIC (N/A)  REPAIR, HERNIA, UMBILICAL   1 Day Post-Op     Interval History: No issues overnight. Tolerating a regular diet with well controlled pain.    Medications:  Continuous Infusions:  Scheduled Meds:   acetaminophen  1,000 mg Oral Q8H    ciprofloxacin HCl  500 mg Oral Q12H    enoxaparin  40 mg Subcutaneous Daily    ibuprofen  400 mg Oral Q8H    metroNIDAZOLE  500 mg Oral Q8H     PRN Meds:HYDROmorphone, oxyCODONE, oxyCODONE     Review of patient's allergies indicates:   Allergen Reactions    Penicillins Anaphylaxis     Rash and throat closes     Objective:     Vital Signs (Most Recent):  Temp: 99.1 °F (37.3 °C) (12/21/22 0444)  Pulse: 80 (12/21/22 0444)  Resp: 18 (12/21/22 0444)  BP: (!) 102/53 (12/21/22 0444)  SpO2: 97 % (12/21/22 0444)   Vital Signs (24h Range):  Temp:  [98.1 °F (36.7 °C)-99.6 °F (37.6 °C)] 99.1 °F (37.3 °C)  Pulse:  [2-96] 80  Resp:  [15-21] 18  SpO2:  [94 %-100 %] 97 %  BP: (102-139)/(53-84) 102/53     Weight: 91.7 kg (202 lb 2.6 oz)  Body mass index is 29.01 kg/m².    Intake/Output - Last 3 Shifts         12/19 0700 12/20 0659 12/20 0700 12/21 0659 12/21 0700  12/22 0659    IV Piggyback  750     Total Intake(mL/kg)  750 (8.2)     Urine (mL/kg/hr)  830 (0.4)     Blood  25     Total Output  855      Net  -105            Urine Occurrence  2 x             Physical Exam  Vitals and nursing note reviewed.   Constitutional:       General: He is not in acute distress.  Cardiovascular:      Rate and Rhythm: Normal rate.      Pulses: Normal pulses.   Pulmonary:      Effort: Pulmonary effort is normal. No respiratory distress.   Abdominal:      General: There is no distension.      Palpations: Abdomen is soft.      Tenderness: There is no abdominal tenderness.      Comments: Incisions clean/dry; dermabond in place; soft non-tender and non-distended abdomen    Neurological:      General: No focal deficit present.      Mental Status: He is alert and oriented to person, place, and time.       Significant Labs:  I have reviewed all pertinent lab results within the past 24 hours.  CBC:   Recent Labs   Lab 12/20/22  0624   WBC 20.18*   RBC 5.58   HGB 16.5   HCT 47.0      MCV 84   MCH 29.6   MCHC 35.1     BMP:   Recent Labs   Lab 12/20/22  0624   *   *   K 3.8   CL 97   CO2 24   BUN 7   CREATININE 0.9   CALCIUM 9.3     CMP:   Recent Labs   Lab 12/20/22  0624   *   CALCIUM 9.3   ALBUMIN 4.2   PROT 7.7   *   K 3.8   CO2 24   CL 97   BUN 7   CREATININE 0.9   ALKPHOS 67   ALT 18   AST 16   BILITOT 0.8       Significant Diagnostics:  I have reviewed all pertinent imaging results/findings within the past 24 hours.    Assessment/Plan:     * Acute appendicitis with localized peritonitis, without perforation, abscess, or gangrene  Linus Grant is a 27 y.o. gentleman with acute perforated appendicitis s/p laparoscopic appendectomy on 12/20/22. Tolerating a  regular diet.     - Regular diet  - PO cipro/flagyl  - PO pain, nausea meds    Dispo: DC today with 5-days of oral antibiotics. RTC in 2 weeks with Dr. Analia Hall MD  General Surgery  [facility] - Surgery

## 2022-12-21 NOTE — DISCHARGE SUMMARY
[facility]  General Surgery  Discharge Summary      Patient Name: Linus Grant  MRN: [id number]  Admission Date: 12/20/2022  Hospital Length of Stay: 0 days  Discharge Date and Time:  12/21/2022 9:38 AM  Attending Physician: Nhan Helms MD   Discharging Provider: Cesar Morales MD  Primary Care Provider: Primary Doctor No    HPI:   Linus Grant is a 27 y.o. gentleman with no PMH who presents with abdominal pain since yesterday morning. He reports associated nausea and nonbilious emesis. Denies fevers, chills. He presented to the ED yesterday and was found to have appendicitis with fecaliths, but had prior engagements that he had to attend to and left AMA. He returns this morning with worsening abdominal pain and his pain has moved from his umbilicus to his RLQ. His WBC has gone from 16 to 20. He remains nontoxic appearing. Tender in his RLQ, positive rebound.         Procedure(s) (LRB):  APPENDECTOMY, LAPAROSCOPIC (N/A)  REPAIR, HERNIA, UMBILICAL      Indwelling Lines/Drains at time of discharge:   Lines/Drains/Airways     None               Hospital Course: [place] [facility] [place] appendectomy due to acute appendicitis. The procedure was performed without complication and patient was transferred to the floor for further post op care and monitoring. Their postoperative course was uneventful and patient progressed according to plan. Pain was controlled with a combination of IV and PO narcotic pain medications. Diet has been advanced throughout the hospital stay. Patient is now ambulating without assistance, tolerating a regular diet, pain is well controlled with PO pain medication, and is voiding appropriately. Patient now meets all criteria for discharge. Will send home with a 5 day course of antibiotics      Goals of Care Treatment Preferences:  Code Status: Full Code      Consults:   Consults (From admission, onward)        Status Ordering Provider      Inpatient consult to General Surgery  Once        Provider:  (Not yet assigned)    Completed OTF PEREYRA          Significant Diagnostic Studies: Labs:   CMP   Recent Labs   Lab 12/19/22  1450 12/20/22  0624   * 131*   K 4.2 3.8    97   CO2 23 24   * 128*   BUN 8 7   CREATININE 0.7 0.9   CALCIUM 9.2 9.3   PROT 7.8 7.7   ALBUMIN 4.4 4.2   BILITOT 0.4 0.8   ALKPHOS 70 67   AST 18 16   ALT 20 18   ANIONGAP 8 10   , CBC   Recent Labs   Lab 12/19/22  1450 12/20/22  0624   WBC 15.93* 20.18*   HGB 15.6 16.5   HCT 46.6 47.0    261   , INR No results found for: INR, PROTIME, Lipid Panel No results found for: CHOL, HDL, LDLCALC, TRIG, CHOLHDL, Troponin No results for input(s): TROPONINI in the last 168 hours., A1C: No results for input(s): HGBA1C in the last 4320 hours. and All labs within the past 24 hours have been reviewed  Microbiology: Blood Culture No results found for: LABBLOO, Sputum Culture No results found for: GSRESP, RESPIRATORYC and Urine Culture  No results found for: LABURIN  Radiology: X-Ray: CXR: X-Ray Chest 1 View (CXR): No results found for this visit on 12/20/22. and X-Ray Chest PA and Lateral (CXR): No results found for this visit on 12/20/22. and KUB: X-Ray Abdomen AP 1 View (KUB): No results found for this visit on 12/20/22.  CT scan: CT ABDOMEN PELVIS WITH CONTRAST: No results found for this visit on 12/20/22. and CT ABDOMEN PELVIS WITHOUT CONTRAST: No results found for this visit on 12/20/22.  Cardiac Graphics: Echocardiogram: 2D echo with color flow doppler: No results found for this or any previous visit. and Transthoracic echo (TTE) complete (Cupid Only): No results found for this or any previous visit.    Specimen (24h ago, onward)     Start     Ordered    12/20/22 1530  Specimen to Pathology, Surgery General Surgery  Once        Comments: Pre-op Diagnosis: Acute appendicitis with localized peritonitis, without perforation, abscess, or gangrene  [K35.30]Procedure(s):APPENDECTOMY, LAPAROSCOPICREPAIR, HERNIA, UMBILICAL Number of specimens: 1Name of specimens: 1. Appendix- permanent     References:    Click here for ordering Quick Tip   Question Answer Comment   Procedure Type: General Surgery    Which provider would you like to cc? NHAN HELMS    Release to patient Immediate        12/20/22 1605                Pending Diagnostic Studies:     Procedure Component Value Units Date/Time    Specimen to Pathology, Surgery General Surgery [[id number]] Collected: 12/20/22 1605    Order Status: Sent Lab Status: In process Updated: 12/20/22 1726    Specimen: Tissue         Final Active Diagnoses:    Diagnosis Date Noted POA    PRINCIPAL PROBLEM:  Acute appendicitis with localized peritonitis, without perforation, abscess, or gangrene [K35.30] 12/19/2022 Yes      Problems Resolved During this Admission:      Discharged Condition: good    Disposition: Home or Self Care    Follow Up:   Follow-up Information     Nhan Helms MD Follow up in 2 week(s).    Specialty: General Surgery  Why: Post-op  Contact information:  [address] [place] [id number]  [phone]                       Patient Instructions:      Diet general     Call MD for:  temperature >100.4     Call MD for:  persistent nausea and vomiting     Call MD for:  severe uncontrolled pain     Call MD for:  redness, tenderness, or signs of infection (pain, swelling, redness, odor or green/yellow discharge around incision site)     No dressing needed   Order Comments: No heavy lifting for 4-6 weeks, activity as tolerated. Patient can shower, allow water to run over incisions. No soaking in bath or pools for 2 weeks. Do not pick at surgical glue, it will come off on its own.     Lifting restrictions   Order Comments: No heavy lifting greater than 10 pounds (about the weight of a gallon of milk) for 6 week postoperatively. This is to prevent new/recurrent hernia at your incision sites while  "they heal.     No driving until:   Order Comments: While taking narcotic pain medications.     Notify your health care provider if you experience any of the following:  temperature >100.4     Notify your health care provider if you experience any of the following:  persistent nausea and vomiting or diarrhea     Notify your health care provider if you experience any of the following:  severe uncontrolled pain     Notify your health care provider if you experience any of the following:  redness, tenderness, or signs of infection (pain, swelling, redness, odor or green/yellow discharge around incision site)     Notify your health care provider if you experience any of the following:  difficulty breathing or increased cough     Notify your health care provider if you experience any of the following:  severe persistent headache     Notify your health care provider if you experience any of the following:  worsening rash     Notify your health care provider if you experience any of the following:  persistent dizziness, light-headedness, or visual disturbances     Notify your health care provider if you experience any of the following:  increased confusion or weakness     Activity as tolerated     Shower on day dressing removed (No bath)   Order Comments: You may SHOWER 48 hours after surgery. If you have gauze/tape dressings in place, you should remove them prior to showering. If you have Dermabond (skin glue) or white "Steri strips" in place, these will eventually peel off on their own after 1-2 weeks. NO SUBMERSION of your incisions (bath, pool, hot tub, etc) until your postop appointment. This allows your incisions to heal and to avoid a wound infection.     Medications:  Reconciled Home Medications:      Medication List      START taking these medications    ciprofloxacin HCl 500 MG tablet  Commonly known as: CIPRO  [place] 1 tableta (500 mg en total) por vía oral cada 12 (doce) horas por 5 días  (Take 1 tablet (500 mg " total) by mouth every 12 (twelve) hours. for 5 days)     metroNIDAZOLE 500 MG tablet  Commonly known as: FLAGYL  Take 1 tablet (500 mg total) by mouth every 8 (eight) hours. for 5 days     oxyCODONE 5 MG immediate release tablet  Commonly known as: ROXICODONE  [place] 1 tableta (5 mg en total) por vía oral cada 6 (seis) horas según sea necesario para el dolor.  (Take 1 tablet (5 mg total) by mouth every 6 (six) hours as needed for Pain.)          Time spent on the discharge of patient: 15 minutes    Cesar Morales MD  General Surgery  [facility] - Surgery

## 2022-12-23 NOTE — ANESTHESIA POSTPROCEDURE EVALUATION
Anesthesia Post Evaluation    Patient: Linus Grant    Procedure(s) Performed: Procedure(s) (LRB):  APPENDECTOMY, LAPAROSCOPIC (N/A)  REPAIR, HERNIA, UMBILICAL    Final Anesthesia Type: general      Patient location during evaluation: PACU  Patient participation: Yes- Able to Participate  Level of consciousness: awake and alert and oriented  Post-procedure vital signs: reviewed and stable  Pain management: adequate  Airway patency: patent  LANNY mitigation strategies: Intraoperative administration of CPAP, nasopharyngeal airway, or oral appliance during sedation, Multimodal analgesia, Extubation while patient is awake, Verification of full reversal of neuromuscular block and Extubation and recovery carried out in lateral, semiupright, or other nonsupine position  PONV status at discharge: No PONV  Anesthetic complications: no      Cardiovascular status: hemodynamically stable  Respiratory status: unassisted  Hydration status: euvolemic  Follow-up not needed.          Vitals Value Taken Time   /61 12/21/22 0818   Temp 36.7 °C (98.1 °F) 12/21/22 0818   Pulse 78 12/21/22 0818   Resp 18 12/21/22 0818   SpO2 98 % 12/21/22 0818         Event Time   Out of Recovery 16:15:00         Pain/Seema Score: No data recorded

## 2022-12-23 NOTE — NURSING
Discharge instruction and education provided. Patient voices understanding. IV site removed cath tip intact. Patient shows no acute distress. Medication delivered to the  bedside.

## 2022-12-28 LAB
FINAL PATHOLOGIC DIAGNOSIS: NORMAL
Lab: NORMAL

## 2023-01-04 ENCOUNTER — TELEPHONE (OUTPATIENT)
Dept: SURGERY | Facility: CLINIC | Age: 28
End: 2023-01-04

## 2023-01-04 NOTE — TELEPHONE ENCOUNTER
With the assistance of  [id number], I attempted to contact he patient regarding his post-op appointment. No answer but left voicemail with direct call back number.

## (undated) DEVICE — ADHESIVE DERMABOND ADVANCED

## (undated) DEVICE — ELECTRODE REM PLYHSV RETURN 9

## (undated) DEVICE — DRAPE STERI INSTRUMENT 1018

## (undated) DEVICE — TUBING HF INSUFFLATION W/ FLTR

## (undated) DEVICE — BLADE SURG CARBON STEEL SZ11

## (undated) DEVICE — NDL HYPO REG 25G X 1 1/2

## (undated) DEVICE — BAG TISS RETRV MONARCH 10MM

## (undated) DEVICE — DRAPE CORETEMP FLD WRM 56X62IN

## (undated) DEVICE — TROCAR KII BLLN 12MM 10CM

## (undated) DEVICE — SUT MCRYL PLUS 4-0 PS2 27IN

## (undated) DEVICE — TROCAR ENDOPATH XCEL 5MM 7.5CM

## (undated) DEVICE — STAPLER INT LINEAR ARTC 3.5-45

## (undated) DEVICE — SOL NS 1000CC

## (undated) DEVICE — SUT 0 VICRYL / UR6 (J603)

## (undated) DEVICE — TRAY MINOR GEN SURG OMC

## (undated) DEVICE — KIT ANTIFOG W/SPONG & FLUID

## (undated) DEVICE — TRAY CATH FOL SIL URIMTR 16FR

## (undated) DEVICE — TOWEL OR DISP STRL BLUE 4/PK

## (undated) DEVICE — DRAPE ABDOMINAL TIBURON 14X11

## (undated) DEVICE — CART STAPLE RELD 45MM WHT